# Patient Record
Sex: MALE | Race: WHITE | NOT HISPANIC OR LATINO
[De-identification: names, ages, dates, MRNs, and addresses within clinical notes are randomized per-mention and may not be internally consistent; named-entity substitution may affect disease eponyms.]

---

## 2017-07-06 ENCOUNTER — LABORATORY RESULT (OUTPATIENT)
Age: 57
End: 2017-07-06

## 2018-07-17 ENCOUNTER — APPOINTMENT (OUTPATIENT)
Dept: FAMILY MEDICINE | Facility: CLINIC | Age: 58
End: 2018-07-17
Payer: COMMERCIAL

## 2018-07-17 VITALS
TEMPERATURE: 98.6 F | RESPIRATION RATE: 21 BRPM | SYSTOLIC BLOOD PRESSURE: 124 MMHG | DIASTOLIC BLOOD PRESSURE: 76 MMHG | OXYGEN SATURATION: 97 % | HEART RATE: 83 BPM

## 2018-07-17 PROCEDURE — 99214 OFFICE O/P EST MOD 30 MIN: CPT | Mod: 25

## 2018-07-17 PROCEDURE — G0444 DEPRESSION SCREEN ANNUAL: CPT | Mod: 79

## 2018-08-02 ENCOUNTER — LABORATORY RESULT (OUTPATIENT)
Age: 58
End: 2018-08-02

## 2018-08-03 LAB
25(OH)D3 SERPL-MCNC: 52 NG/ML
ALBUMIN SERPL ELPH-MCNC: 4.8 G/DL
ALP BLD-CCNC: 56 U/L
ALT SERPL-CCNC: 19 U/L
ANION GAP SERPL CALC-SCNC: 12 MMOL/L
APPEARANCE: CLEAR
AST SERPL-CCNC: 20 U/L
B BURGDOR AB SER-IMP: NEGATIVE
B BURGDOR IGG+IGM SER QL IB: NORMAL
B BURGDOR IGG+IGM SER QL: 0.62 INDEX
BASOPHILS # BLD AUTO: 0.06 K/UL
BASOPHILS NFR BLD AUTO: 1 %
BILIRUB SERPL-MCNC: 0.5 MG/DL
BILIRUBIN URINE: NEGATIVE
BLOOD URINE: ABNORMAL
BUN SERPL-MCNC: 19 MG/DL
CALCIUM SERPL-MCNC: 9.3 MG/DL
CHLORIDE SERPL-SCNC: 103 MMOL/L
CHOLEST SERPL-MCNC: 205 MG/DL
CHOLEST/HDLC SERPL: 3.8 RATIO
CO2 SERPL-SCNC: 25 MMOL/L
COLOR: YELLOW
CREAT SERPL-MCNC: 1.14 MG/DL
CREAT SPEC-SCNC: 140 MG/DL
CRP SERPL-MCNC: <0.1 MG/DL
EOSINOPHIL # BLD AUTO: 0.18 K/UL
EOSINOPHIL NFR BLD AUTO: 3 %
ERYTHROCYTE [SEDIMENTATION RATE] IN BLOOD BY WESTERGREN METHOD: 4 MM/HR
FERRITIN SERPL-MCNC: 226 NG/ML
FOLATE SERPL-MCNC: 15.8 NG/ML
FT4I SERPL CALC-MCNC: 7 INDEX
GLUCOSE QUALITATIVE U: NEGATIVE MG/DL
GLUCOSE SERPL-MCNC: 105 MG/DL
HBA1C MFR BLD HPLC: 5.7 %
HCT VFR BLD CALC: 45.1 %
HCYS SERPL-MCNC: 8.2 UMOL/L
HDLC SERPL-MCNC: 54 MG/DL
HGB BLD-MCNC: 15.1 G/DL
IMM GRANULOCYTES NFR BLD AUTO: 0.3 %
INSULIN SERPL-MCNC: 5.7 UU/ML
KETONES URINE: NEGATIVE
LDLC SERPL CALC-MCNC: 139 MG/DL
LEUKOCYTE ESTERASE URINE: NEGATIVE
LYMPHOCYTES # BLD AUTO: 2.22 K/UL
LYMPHOCYTES NFR BLD AUTO: 37.2 %
MAN DIFF?: NORMAL
MCHC RBC-ENTMCNC: 29.3 PG
MCHC RBC-ENTMCNC: 33.5 GM/DL
MCV RBC AUTO: 87.4 FL
MICROALBUMIN 24H UR DL<=1MG/L-MCNC: <1.2 MG/DL
MICROALBUMIN/CREAT 24H UR-RTO: NORMAL
MONOCYTES # BLD AUTO: 0.62 K/UL
MONOCYTES NFR BLD AUTO: 10.4 %
NEUTROPHILS # BLD AUTO: 2.87 K/UL
NEUTROPHILS NFR BLD AUTO: 48.1 %
NITRITE URINE: NEGATIVE
PH URINE: 6.5
PLATELET # BLD AUTO: 274 K/UL
POTASSIUM SERPL-SCNC: 4.5 MMOL/L
PROT SERPL-MCNC: 6.9 G/DL
PROTEIN URINE: NEGATIVE MG/DL
PSA FREE FLD-MCNC: 37.4
PSA FREE SERPL-MCNC: 0.49 NG/ML
PSA SERPL-MCNC: 1.31 NG/ML
RBC # BLD: 5.16 M/UL
RBC # FLD: 13 %
RHEUMATOID FACT SER QL: <10 IU/ML
SODIUM SERPL-SCNC: 140 MMOL/L
SPECIFIC GRAVITY URINE: 1.02
T3FREE SERPL-MCNC: 2.54 PG/ML
T3RU NFR SERPL: 0.84 INDEX
T4 FREE SERPL-MCNC: 1.3 NG/DL
T4 SERPL-MCNC: 5.9 UG/DL
TRIGL SERPL-MCNC: 58 MG/DL
TSH SERPL-ACNC: 2.25 UIU/ML
TTG IGA SER IA-ACNC: <5 UNITS
TTG IGA SER-ACNC: NEGATIVE
TTG IGG SER IA-ACNC: <5 UNITS
TTG IGG SER IA-ACNC: NEGATIVE
UROBILINOGEN URINE: NEGATIVE MG/DL
VIT B12 SERPL-MCNC: 791 PG/ML
WBC # FLD AUTO: 5.97 K/UL

## 2018-08-06 LAB
ANA SER IF-ACNC: NEGATIVE
ANAPLASMA PHAGOCYTO IGM COMENT: NORMAL
ANAPLASMA PHAGOCYTO IGM COMMENT: NORMAL
ANAPLASMA PHAGOCYTOPHILIA IGG ANTIBODIES: NORMAL
ANAPLASMA PHAGOCYTOPHILIA IGM ANTIBODIES: NORMAL
CLAM IGE QN: <0.1 KUA/L
CODFISH IGE QN: <0.1 KUA/L
CORN IGE QN: <0.1 KUA/L
COW MILK IGE QN: <0.1 KUA/L
DEPRECATED CLAM IGE RAST QL: 0
DEPRECATED CODFISH IGE RAST QL: 0
DEPRECATED CORN IGE RAST QL: 0
DEPRECATED COW MILK IGE RAST QL: 0
DEPRECATED EGG WHITE IGE RAST QL: 0
DEPRECATED GLUTEN IGE RAST QL: <0.1 KUA/L
DEPRECATED PEANUT IGE RAST QL: 0
DEPRECATED SCALLOP IGE RAST QL: <0.1 KUA/L
DEPRECATED SESAME SEED IGE RAST QL: 0
DEPRECATED SHRIMP IGE RAST QL: 0
DEPRECATED SOYBEAN IGE RAST QL: 0
DEPRECATED WALNUT IGE RAST QL: 0
DEPRECATED WHEAT IGE RAST QL: NORMAL
EGG WHITE IGE QN: <0.1 KUA/L
EHRLICIA CHAFFEENIS IGG ANTIBODIES: NORMAL
EHRLICIA CHAFFEENIS IGG COMMENT: NORMAL
EHRLICIA CHAFFEENIS IGG INTERP: NORMAL
EHRLICIA CHAFFEENIS IGM ANTIBODIES: NORMAL
ESTROGEN SERPL-MCNC: 54 PG/ML
GLUTEN IGG QN: 0
PEANUT IGE QN: <0.1 KUA/L
SCALLOP IGE QN: 0
SCALLOP IGE QN: <0.1 KUA/L
SESAME SEED IGE QN: <0.1 KUA/L
SOYBEAN IGE QN: <0.1 KUA/L
THYROGLOB AB SERPL-ACNC: <20 IU/ML
THYROPEROXIDASE AB SERPL IA-ACNC: <10 IU/ML
TOTAL IGE SMQN RAST: 83 KU/L
WALNUT IGE QN: <0.1 KUA/L
WHEAT IGE QN: 0.1 KUA/L

## 2018-08-07 LAB
B MICROTI AB SER QL: NORMAL
BABESIA ANTIBODIES, IGG: NORMAL
BABESIA ANTIBODIES, IGM: NORMAL
ESTRONE SERPL-MCNC: 13 PG/ML
R RICKETTSI IGG CSF-ACNC: NEGATIVE
R RICKETTSI IGM CSF-ACNC: 0.31 INDEX
TESTOST BND SERPL-MCNC: 8.3 PG/ML
TESTOST SERPL-MCNC: 431.3 NG/DL

## 2019-02-05 ENCOUNTER — APPOINTMENT (OUTPATIENT)
Dept: FAMILY MEDICINE | Facility: CLINIC | Age: 59
End: 2019-02-05
Payer: COMMERCIAL

## 2019-02-05 DIAGNOSIS — N20.0 CALCULUS OF KIDNEY: ICD-10-CM

## 2019-02-05 DIAGNOSIS — Z09 ENCOUNTER FOR FOLLOW-UP EXAMINATION AFTER COMPLETED TREATMENT FOR CONDITIONS OTHER THAN MALIGNANT NEOPLASM: ICD-10-CM

## 2019-02-05 DIAGNOSIS — R31.9 HEMATURIA, UNSPECIFIED: ICD-10-CM

## 2019-02-05 DIAGNOSIS — K82.8 OTHER SPECIFIED DISEASES OF GALLBLADDER: ICD-10-CM

## 2019-02-05 PROCEDURE — 99215 OFFICE O/P EST HI 40 MIN: CPT

## 2019-02-07 LAB — BACTERIA UR CULT: NORMAL

## 2019-02-09 VITALS
RESPIRATION RATE: 18 BRPM | HEIGHT: 70 IN | TEMPERATURE: 98 F | HEART RATE: 80 BPM | OXYGEN SATURATION: 98 % | WEIGHT: 170 LBS | BODY MASS INDEX: 24.34 KG/M2 | SYSTOLIC BLOOD PRESSURE: 128 MMHG | DIASTOLIC BLOOD PRESSURE: 76 MMHG

## 2019-02-09 PROBLEM — Z09 HOSPITAL DISCHARGE FOLLOW-UP: Status: ACTIVE | Noted: 2019-02-09

## 2019-02-09 PROBLEM — K82.8 GALLBLADDER SLUDGE: Status: ACTIVE | Noted: 2019-02-09

## 2019-02-19 ENCOUNTER — LABORATORY RESULT (OUTPATIENT)
Age: 59
End: 2019-02-19

## 2019-02-27 LAB
25(OH)D3 SERPL-MCNC: 51 NG/ML
ALBUMIN SERPL ELPH-MCNC: 4.6 G/DL
ALP BLD-CCNC: 58 U/L
ALT SERPL-CCNC: 24 U/L
ANION GAP SERPL CALC-SCNC: 11 MMOL/L
APPEARANCE: CLEAR
AST SERPL-CCNC: 21 U/L
BASOPHILS # BLD AUTO: 0.07 K/UL
BASOPHILS NFR BLD AUTO: 1.2 %
BILIRUB SERPL-MCNC: 0.6 MG/DL
BILIRUBIN URINE: NEGATIVE
BLOOD URINE: NORMAL
BUN SERPL-MCNC: 22 MG/DL
CALCIUM SERPL-MCNC: 9.8 MG/DL
CHLORIDE SERPL-SCNC: 102 MMOL/L
CHOLEST SERPL-MCNC: 218 MG/DL
CHOLEST/HDLC SERPL: 3.7 RATIO
CK SERPL-CCNC: 81 U/L
CO2 SERPL-SCNC: 27 MMOL/L
COLOR: NORMAL
CREAT SERPL-MCNC: 1.19 MG/DL
CREAT SPEC-SCNC: 145 MG/DL
EOSINOPHIL # BLD AUTO: 0.2 K/UL
EOSINOPHIL NFR BLD AUTO: 3.3 %
FERRITIN SERPL-MCNC: 257 NG/ML
FOLATE SERPL-MCNC: >20 NG/ML
GGT SERPL-CCNC: 12 U/L
GLUCOSE QUALITATIVE U: NEGATIVE
GLUCOSE SERPL-MCNC: 105 MG/DL
HBA1C MFR BLD HPLC: 5.6 %
HCT VFR BLD CALC: 46.6 %
HDLC SERPL-MCNC: 59 MG/DL
HGB BLD-MCNC: 15.2 G/DL
IMM GRANULOCYTES NFR BLD AUTO: 0.5 %
KETONES URINE: NEGATIVE
LDLC SERPL CALC-MCNC: 147 MG/DL
LEUKOCYTE ESTERASE URINE: NEGATIVE
LYMPHOCYTES # BLD AUTO: 2.35 K/UL
LYMPHOCYTES NFR BLD AUTO: 39.2 %
MAN DIFF?: NORMAL
MCHC RBC-ENTMCNC: 28.8 PG
MCHC RBC-ENTMCNC: 32.6 GM/DL
MCV RBC AUTO: 88.4 FL
MICROALBUMIN 24H UR DL<=1MG/L-MCNC: <1.2 MG/DL
MICROALBUMIN/CREAT 24H UR-RTO: NORMAL MG/G
MONOCYTES # BLD AUTO: 0.58 K/UL
MONOCYTES NFR BLD AUTO: 9.7 %
NEUTROPHILS # BLD AUTO: 2.77 K/UL
NEUTROPHILS NFR BLD AUTO: 46.1 %
NITRITE URINE: NEGATIVE
PH URINE: 7
PLATELET # BLD AUTO: 282 K/UL
POTASSIUM SERPL-SCNC: 4.7 MMOL/L
PROT SERPL-MCNC: 7.1 G/DL
PROTEIN URINE: NEGATIVE
PSA FREE FLD-MCNC: 37 %
PSA FREE SERPL-MCNC: 0.53 NG/ML
PSA SERPL-MCNC: 1.45 NG/ML
RBC # BLD: 5.27 M/UL
RBC # FLD: 12.7 %
SODIUM SERPL-SCNC: 140 MMOL/L
SPECIFIC GRAVITY URINE: 1.02
T3RU NFR SERPL: 0.8 TBI
T4 FREE SERPL-MCNC: 1.3 NG/DL
TESTOST BND SERPL-MCNC: 9.6 PG/ML
TESTOST SERPL-MCNC: 559.8 NG/DL
TRIGL SERPL-MCNC: 60 MG/DL
TSH SERPL-ACNC: 2.8 UIU/ML
UROBILINOGEN URINE: NORMAL
VIT B12 SERPL-MCNC: 672 PG/ML
WBC # FLD AUTO: 6 K/UL

## 2019-04-02 PROBLEM — B02.9 SHINGLES: Status: ACTIVE | Noted: 2019-04-02

## 2019-04-03 ENCOUNTER — OUTPATIENT (OUTPATIENT)
Dept: OUTPATIENT SERVICES | Facility: HOSPITAL | Age: 59
LOS: 1 days | Discharge: HOME | End: 2019-04-03

## 2019-04-03 DIAGNOSIS — C82.00 FOLLICULAR LYMPHOMA GRADE I, UNSPECIFIED SITE: ICD-10-CM

## 2019-04-04 ENCOUNTER — LABORATORY RESULT (OUTPATIENT)
Age: 59
End: 2019-04-04

## 2019-04-04 ENCOUNTER — APPOINTMENT (OUTPATIENT)
Dept: HEMATOLOGY ONCOLOGY | Facility: CLINIC | Age: 59
End: 2019-04-04
Payer: COMMERCIAL

## 2019-04-04 ENCOUNTER — RESULT REVIEW (OUTPATIENT)
Age: 59
End: 2019-04-04

## 2019-04-04 VITALS
DIASTOLIC BLOOD PRESSURE: 85 MMHG | OXYGEN SATURATION: 98 % | WEIGHT: 166.31 LBS | HEIGHT: 70 IN | SYSTOLIC BLOOD PRESSURE: 138 MMHG | HEART RATE: 71 BPM | BODY MASS INDEX: 23.81 KG/M2 | TEMPERATURE: 98.1 F

## 2019-04-04 LAB
BASOPHILS # BLD AUTO: 0.1 K/UL — SIGNIFICANT CHANGE UP (ref 0–0.2)
BASOPHILS NFR BLD AUTO: 1.7 % — SIGNIFICANT CHANGE UP (ref 0–2)
EOSINOPHIL # BLD AUTO: 0.2 K/UL — SIGNIFICANT CHANGE UP (ref 0–0.5)
EOSINOPHIL NFR BLD AUTO: 3.1 % — SIGNIFICANT CHANGE UP (ref 0–6)
HCT VFR BLD CALC: 48.9 % — SIGNIFICANT CHANGE UP (ref 39–50)
HGB BLD-MCNC: 15.9 G/DL — SIGNIFICANT CHANGE UP (ref 13–17)
LYMPHOCYTES # BLD AUTO: 2.4 K/UL — SIGNIFICANT CHANGE UP (ref 1–3.3)
LYMPHOCYTES # BLD AUTO: 30.6 % — SIGNIFICANT CHANGE UP (ref 13–44)
MCHC RBC-ENTMCNC: 28.8 PG — SIGNIFICANT CHANGE UP (ref 27–34)
MCHC RBC-ENTMCNC: 32.5 G/DL — SIGNIFICANT CHANGE UP (ref 32–36)
MCV RBC AUTO: 88.4 FL — SIGNIFICANT CHANGE UP (ref 80–100)
MONOCYTES # BLD AUTO: 0.8 K/UL — SIGNIFICANT CHANGE UP (ref 0–0.9)
MONOCYTES NFR BLD AUTO: 9.8 % — SIGNIFICANT CHANGE UP (ref 2–14)
NEUTROPHILS # BLD AUTO: 4.3 K/UL — SIGNIFICANT CHANGE UP (ref 1.8–7.4)
NEUTROPHILS NFR BLD AUTO: 54.8 % — SIGNIFICANT CHANGE UP (ref 43–77)
PLATELET # BLD AUTO: 300 K/UL — SIGNIFICANT CHANGE UP (ref 150–400)
RBC # BLD: 5.53 M/UL — SIGNIFICANT CHANGE UP (ref 4.2–5.8)
RBC # FLD: 11.7 % — SIGNIFICANT CHANGE UP (ref 10.3–14.5)
WBC # BLD: 7.9 K/UL — SIGNIFICANT CHANGE UP (ref 3.8–10.5)
WBC # FLD AUTO: 7.9 K/UL — SIGNIFICANT CHANGE UP (ref 3.8–10.5)

## 2019-04-04 PROCEDURE — 99205 OFFICE O/P NEW HI 60 MIN: CPT

## 2019-04-04 RX ORDER — VALACYCLOVIR 1 G/1
1 TABLET, FILM COATED ORAL
Qty: 21 | Refills: 5 | Status: DISCONTINUED | COMMUNITY
Start: 2019-04-02 | End: 2019-04-04

## 2019-04-04 RX ORDER — NITROFURANTOIN MACROCRYSTALS 100 MG/1
100 CAPSULE ORAL TWICE DAILY
Qty: 20 | Refills: 0 | Status: DISCONTINUED | COMMUNITY
Start: 2019-02-05 | End: 2019-04-04

## 2019-04-05 NOTE — ASSESSMENT
[FreeTextEntry1] : 58 year gentleman with new onset retroperitoneal lymphadenopathy that has developed over a course of 7-8 weeks.  He has no prior history of malignancy.  2/1/19 CT abd/pelvis was negative for any concerning findings. MRI/MRCP 3/30/19 showed enlarged retroperitoneal nodes, with one measuring 4.5 cm.  Scrotal US was negative.\par \par He has ongoing dysuria, but no fevers or B symptoms. He has a previous history of prostatitis.  Differential diagnosis at this time is either lymphoma/lymphoproliferative disorder vs. ?related to prostatitis.\par \par \par Plan:\par - PET/CT scan ASAP\par - Will discuss with IR if the nodes are accessible for  biopsy\par - Urinalysis and urine culture today. \par -labs including LDH, immunoelectrophoresis today\par - Tentative follow up in 2 weeks

## 2019-04-05 NOTE — PHYSICAL EXAM
[Normal] : affect appropriate [de-identified] : supple [de-identified] : clear bilaterally [de-identified] : S1/S2 [de-identified] : No edema [de-identified] : soft, non tender, non distended [de-identified] : No back pain on palpation

## 2019-04-05 NOTE — CONSULT LETTER
[Dear  ___] : Dear  [unfilled], [Consult Letter:] : I had the pleasure of evaluating your patient, [unfilled]. [Please see my note below.] : Please see my note below. [Consult Closing:] : Thank you very much for allowing me to participate in the care of this patient.  If you have any questions, please do not hesitate to contact me. [Sincerely,] : Sincerely, [FreeTextEntry3] : Concepción Sanchez MD\par Medical Oncology/Hematology\par [facility] [place]\par \par \par [facility], [place], [facility] by [facility]\par

## 2019-04-05 NOTE — HISTORY OF PRESENT ILLNESS
[de-identified] : Mr. Puri presents with retroperitoneal lymphadenopathy.\par \par The patient was admitted to [facility] 2/1/19 - 2/2/19 with sharp right sided pain that radiated from his right flank to anterior abdomen. He denied any trauma or heavy lifting. His thoracic spine MRI was unremarkable and CT A/P on 2/1/19 was negative for pyelonephritis and nephroureterolithiasis, but showed gallbladder sludge or tiny stones. Patient had urinary retention in the ED, possibly in the setting of Dilaudid, and De Guzman was placed for a short period of time. \par \par Three weeks ago his back pain worsened and he was unable to sleep. His back pain worsens when he is laying down. He has lost about 7 pounds in the past month. Also reports dysuria, new since his hospital stay and De Guzman removal. He notes that it feels similar to symptoms he experienced with prostatitis in the past. He notes recently developed bad breath. No diarrhea. No CP. Chronic dry cough, been ongoing for years. No SOB. No N/V. He reports that last week he had a rash on his back with associated itching and burning. He reports being more stressed the past 2 months. No fevers or night sweats. No recurrent infections. \par \par Last colonoscopy was in 2016. Cystoscopy few years ago due to persistent hematuria which was normal.\par \par 2/2/19 RUQ sono- cholelithiasis w/o evidence of acute cholecystitis\par 2/1/19 kidney sono - no evidence of hydronephrosis. No evidence of renal calculi bilaterally. Simple cyst in L kidney\par 2/1/19 CT chest - No PE. Clear lungs. \par 2/6/19 urine cytology - negative for malignant cells\par 3/30/19 MRI / MRCP - enlarged hypoenhancing retroperitoneal  aortocaval nodes, 1.6 x 4.5 cm (previously 1.6 cm), and 1.2 x 1.9 cm (previously 0.8 x 1 cm). Additional smaller retroperitoneal nodes also present. \par 4/1/19 scrotal US - small R epididymal head cyst. Small left hydrocele. No evidence for testicular mass. \par PMHx: measles in 2007, No previous history of cancer, lumbar spinal stenosis, congenital disc herniation, prostatitis\par FMHx: cousin with lymphoma in early 40s, cousin with testicular cancer in his 50s, aunt lymphoma in 70s, uncle lung cancer but wife was a 2 pack smoker. \par SHx: , chiropractor

## 2019-04-09 ENCOUNTER — APPOINTMENT (OUTPATIENT)
Dept: HEMATOLOGY ONCOLOGY | Facility: CLINIC | Age: 59
End: 2019-04-09
Payer: COMMERCIAL

## 2019-04-09 DIAGNOSIS — B02.9 ZOSTER W/OUT COMPLICATIONS: ICD-10-CM

## 2019-04-09 LAB
ALBUMIN MFR SERPL ELPH: 66.1 %
ALBUMIN SERPL ELPH-MCNC: 5.1 G/DL
ALBUMIN SERPL-MCNC: 5 G/DL
ALBUMIN/GLOB SERPL: 1.9 RATIO
ALP BLD-CCNC: 65 U/L
ALPHA1 GLOB MFR SERPL ELPH: 3.2 %
ALPHA1 GLOB SERPL ELPH-MCNC: 0.2 G/DL
ALPHA2 GLOB MFR SERPL ELPH: 7 %
ALPHA2 GLOB SERPL ELPH-MCNC: 0.5 G/DL
ALT SERPL-CCNC: 27 U/L
ANION GAP SERPL CALC-SCNC: 13 MMOL/L
APPEARANCE: CLEAR
APTT BLD: 30.9 SEC
AST SERPL-CCNC: 23 U/L
B-GLOBULIN MFR SERPL ELPH: 9.6 %
B-GLOBULIN SERPL ELPH-MCNC: 0.7 G/DL
B2 MICROGLOB SERPL-MCNC: 1.7 MG/L
BILIRUB SERPL-MCNC: 0.2 MG/DL
BILIRUBIN URINE: NEGATIVE
BLOOD URINE: NEGATIVE
BUN SERPL-MCNC: 27 MG/DL
CALCIUM SERPL-MCNC: 10.2 MG/DL
CHLORIDE SERPL-SCNC: 100 MMOL/L
CO2 SERPL-SCNC: 27 MMOL/L
COLOR: YELLOW
CREAT SERPL-MCNC: 1.24 MG/DL
DEPRECATED KAPPA LC FREE/LAMBDA SER: 1.35 RATIO
GAMMA GLOB FLD ELPH-MCNC: 1.1 G/DL
GAMMA GLOB MFR SERPL ELPH: 14.1 %
GLUCOSE QUALITATIVE U: NEGATIVE
IGA SER QL IEP: 91 MG/DL
IGG SER QL IEP: 944 MG/DL
IGM SER QL IEP: 155 MG/DL
INR PPP: 0.96 RATIO
INTERPRETATION SERPL IEP-IMP: NORMAL
KAPPA LC CSF-MCNC: 1.16 MG/DL
KAPPA LC SERPL-MCNC: 1.57 MG/DL
KETONES URINE: NEGATIVE
LDH SERPL-CCNC: 174 U/L
LEUKOCYTE ESTERASE URINE: NEGATIVE
M PROTEIN SPEC IFE-MCNC: NORMAL
NITRITE URINE: NEGATIVE
PH URINE: 5.5
POTASSIUM SERPL-SCNC: 4.6 MMOL/L
PROT SERPL-MCNC: 7.6 G/DL
PROTEIN URINE: NEGATIVE
PT BLD: 10.9 SEC
SODIUM SERPL-SCNC: 140 MMOL/L
SPECIFIC GRAVITY URINE: 1.02
UROBILINOGEN URINE: NORMAL

## 2019-04-10 ENCOUNTER — FORM ENCOUNTER (OUTPATIENT)
Age: 59
End: 2019-04-10

## 2019-04-11 ENCOUNTER — APPOINTMENT (OUTPATIENT)
Dept: NUCLEAR MEDICINE | Facility: CLINIC | Age: 59
End: 2019-04-11
Payer: COMMERCIAL

## 2019-04-11 ENCOUNTER — OUTPATIENT (OUTPATIENT)
Dept: OUTPATIENT SERVICES | Facility: HOSPITAL | Age: 59
LOS: 1 days | End: 2019-04-11

## 2019-04-11 DIAGNOSIS — R59.0 LOCALIZED ENLARGED LYMPH NODES: ICD-10-CM

## 2019-04-11 DIAGNOSIS — M54.9 DORSALGIA, UNSPECIFIED: ICD-10-CM

## 2019-04-11 PROCEDURE — 78815 PET IMAGE W/CT SKULL-THIGH: CPT | Mod: 26,PI

## 2019-04-15 ENCOUNTER — RESULT REVIEW (OUTPATIENT)
Age: 59
End: 2019-04-15

## 2019-04-16 ENCOUNTER — APPOINTMENT (OUTPATIENT)
Dept: SURGICAL ONCOLOGY | Facility: CLINIC | Age: 59
End: 2019-04-16
Payer: COMMERCIAL

## 2019-04-16 ENCOUNTER — TRANSCRIPTION ENCOUNTER (OUTPATIENT)
Age: 59
End: 2019-04-16

## 2019-04-16 VITALS
BODY MASS INDEX: 23.05 KG/M2 | DIASTOLIC BLOOD PRESSURE: 87 MMHG | TEMPERATURE: 98.4 F | RESPIRATION RATE: 16 BRPM | WEIGHT: 161 LBS | HEIGHT: 70 IN | HEART RATE: 63 BPM | SYSTOLIC BLOOD PRESSURE: 147 MMHG | OXYGEN SATURATION: 98 %

## 2019-04-16 DIAGNOSIS — Z80.3 FAMILY HISTORY OF MALIGNANT NEOPLASM OF BREAST: ICD-10-CM

## 2019-04-16 DIAGNOSIS — Z80.1 FAMILY HISTORY OF MALIGNANT NEOPLASM OF TRACHEA, BRONCHUS AND LUNG: ICD-10-CM

## 2019-04-16 DIAGNOSIS — Z80.7 FAMILY HISTORY OF OTHER MALIGNANT NEOPLASMS OF LYMPHOID, HEMATOPOIETIC AND RELATED TISSUES: ICD-10-CM

## 2019-04-16 PROCEDURE — 99204 OFFICE O/P NEW MOD 45 MIN: CPT

## 2019-04-16 NOTE — REASON FOR VISIT
[Initial Consultation] : an initial consultation for [Spouse] : spouse [FreeTextEntry2] : retroperitoneal lymphadenopathy

## 2019-04-16 NOTE — REVIEW OF SYSTEMS
[Negative] : Heme/Lymph [FreeTextEntry2] : weight loss [de-identified] : right lower back/right groin pain [FreeTextEntry8] : loose stools [de-identified] : anxious

## 2019-04-16 NOTE — ASSESSMENT
[FreeTextEntry1] : 58 year gentleman with new onset retroperitoneal lymphadenopathy that has developed over a course of 7-8 weeks. He has no prior history of malignancy. 2/1/19 CT abd/pelvis was negative for any concerning findings. MRI/MRCP 3/30/19 showed enlarged retroperitoneal nodes, with one measuring 4.5 cm. Scrotal US was negative.\par \par PET CT 4/11/19:\par 1) FDG avid retroperitoneal lymph nodes corresponding to findings seen on MRI, suspicious for malignancy, either lymphoma or metastatic disease. \par \par We discussed the risks, benefits, and alternatives of the procedure with the patient, he expressed understanding and agree to proceed with robotic assisted laparoscopic resection of retroperitoneal mass.  Risks include but are not limited to bleeding, infection, injury to surrounding organs.\par \par PLAN:\par 1) OR for robotic assisted laparoscopic resection of retroperitoneal mass, possible open\par \par

## 2019-04-16 NOTE — HISTORY OF PRESENT ILLNESS
[de-identified] : 58 year male presents for an initial consultation for retroperitoneal lymphadenopathy, referred by Dr. Sanchez. \par \par ---------------------------------------------------------------------------------------------------------------------------------\par HPI as per Dr. Sanchez:\par The patient was admitted to [facility] 2/1/19 - 2/2/19 with sharp right sided pain that radiated from his right flank to anterior abdomen. He denied any trauma or heavy lifting. His thoracic spine MRI was unremarkable and CT A/P on 2/1/19 was negative for pyelonephritis and nephroureterolithiasis, but showed gallbladder sludge or tiny stones. Patient had urinary retention in the ED, possibly in the setting of Dilaudid, and De Guzman was placed for a short period of time. \par \par Three weeks ago his back pain worsened and he was unable to sleep. His back pain worsens when he is laying down. He has lost about 7 pounds in the past month. Also reports dysuria, new since his hospital stay and De Guzman removal. He notes that it feels similar to symptoms he experienced with prostatitis in the past. He notes recently developed bad breath. No diarrhea. No CP. Chronic dry cough, been ongoing for years. No SOB. No N/V. He reports that last week he had a rash on his back with associated itching and burning. He reports being more stressed the past 2 months. No fevers or night sweats. No recurrent infections. \par \par Last colonoscopy was in 2016. Cystoscopy few years ago due to persistent hematuria which was normal.\par \par 2/2/19 RUQ sono- cholelithiasis w/o evidence of acute cholecystitis\par 2/1/19 kidney sono - no evidence of hydronephrosis. No evidence of renal calculi bilaterally. Simple cyst in L kidney\par 2/1/19 CT chest - No PE. Clear lungs. \par 2/6/19 urine cytology - negative for malignant cells\par 3/30/19 MRI / MRCP - enlarged hypo enhancing retroperitoneal aortocaval nodes, 1.6 x 4.5 cm (previously 1.6 cm), and 1.2 x 1.9 cm (previously 0.8 x 1 cm). Additional smaller retroperitoneal nodes also present.  Differential considerations of lymphoma/other lymphoproliferative process versus metastatic disease. \par 4/1/19 scrotal US - small R epididymal head cyst. Small left hydrocele. No evidence for testicular mass. \par PMHx: measles in 2007, No previous history of cancer, lumbar spinal stenosis, congenital disc herniation, prostatitis, Lyme disease in 1998, juliette mountain spotted fever in 2015, chicken pox in 1993, rhinoplasty in 1979. No medications, NKA. \par FMHx: cousin with lymphoma in early 40s, cousin with testicular cancer in his 50s, aunt lymphoma in 70s, uncle lung cancer but wife was a 2 pack smoker. \par SHx: , chiropractor.\par \par ------------------------------------------------------------------------------------------------------------------------------\par \par PET CT 4/11/19:\par 1) FDG avid retroperitoneal lymph nodes corresponding to findings seen on MRI, suspicious for malignancy, either lymphoma or metastatic disease. \par 2) Subcentimeter, minimally FDG avid right axillary lymph node is nonspecific, probably reactive.\par 3) 1.8 cm intramedullary stippled calcification right proximal humerus, probably benign enchondroma (consider MRI)\par \par CBC, CMP, LDH WNL, Negative UA\par \par Today he is with c/o 8-10 lb intentional weight loss over the past month due to calorie restriction, occasional night sweats and chills, no fevers, right lower back/right groin pain which is worse at night and when lying on that side. Denies abdominal pain, nausea or vomiting. States he has been experiencing loose stools due to anxiety.

## 2019-04-16 NOTE — CONSULT LETTER
[Dear  ___] : Dear  [unfilled], [Consult Letter:] : I had the pleasure of evaluating your patient, [unfilled]. [Please see my note below.] : Please see my note below. [Consult Closing:] : Thank you very much for allowing me to participate in the care of this patient.  If you have any questions, please do not hesitate to contact me. [Sincerely,] : Sincerely, [DrStorm  ___] : Dr. RAINES [FreeTextEntry3] : Clay Myers MD, MPH, FACS\par Surgical Oncology\par Assistant Professor of Surgery\par [facility] at [facility]

## 2019-04-16 NOTE — PHYSICAL EXAM
[Normal] : supple, no neck mass and thyroid not enlarged [Normal Groin Lymph Nodes] : normal groin lymph nodes [Normal] : oriented to person, place and time, with appropriate affect

## 2019-04-18 ENCOUNTER — APPOINTMENT (OUTPATIENT)
Dept: HEMATOLOGY ONCOLOGY | Facility: CLINIC | Age: 59
End: 2019-04-18

## 2019-04-22 ENCOUNTER — APPOINTMENT (OUTPATIENT)
Dept: SURGICAL ONCOLOGY | Facility: HOSPITAL | Age: 59
End: 2019-04-22

## 2019-04-25 ENCOUNTER — RX CHANGE (OUTPATIENT)
Age: 59
End: 2019-04-25

## 2019-05-17 ENCOUNTER — OUTPATIENT (OUTPATIENT)
Dept: OUTPATIENT SERVICES | Facility: HOSPITAL | Age: 59
LOS: 1 days | Discharge: HOME | End: 2019-05-17
Payer: COMMERCIAL

## 2019-05-17 VITALS
WEIGHT: 156.09 LBS | DIASTOLIC BLOOD PRESSURE: 80 MMHG | SYSTOLIC BLOOD PRESSURE: 130 MMHG | OXYGEN SATURATION: 98 % | HEART RATE: 65 BPM | RESPIRATION RATE: 16 BRPM | HEIGHT: 70 IN | TEMPERATURE: 98 F

## 2019-05-17 DIAGNOSIS — Z98.890 OTHER SPECIFIED POSTPROCEDURAL STATES: Chronic | ICD-10-CM

## 2019-05-17 DIAGNOSIS — E11.9 TYPE 2 DIABETES MELLITUS WITHOUT COMPLICATIONS: ICD-10-CM

## 2019-05-17 DIAGNOSIS — R19.09 OTHER INTRA-ABDOMINAL AND PELVIC SWELLING, MASS AND LUMP: ICD-10-CM

## 2019-05-17 DIAGNOSIS — Z01.818 ENCOUNTER FOR OTHER PREPROCEDURAL EXAMINATION: ICD-10-CM

## 2019-05-17 DIAGNOSIS — K58.9 IRRITABLE BOWEL SYNDROME WITHOUT DIARRHEA: ICD-10-CM

## 2019-05-17 DIAGNOSIS — M54.9 DORSALGIA, UNSPECIFIED: ICD-10-CM

## 2019-05-17 DIAGNOSIS — M51.27 OTHER INTERVERTEBRAL DISC DISPLACEMENT, LUMBOSACRAL REGION: ICD-10-CM

## 2019-05-17 DIAGNOSIS — R73.03 PREDIABETES: ICD-10-CM

## 2019-05-17 DIAGNOSIS — C85.16 UNSPECIFIED B-CELL LYMPHOMA, INTRAPELVIC LYMPH NODES: ICD-10-CM

## 2019-05-17 DIAGNOSIS — R33.9 RETENTION OF URINE, UNSPECIFIED: ICD-10-CM

## 2019-05-17 DIAGNOSIS — Z90.89 ACQUIRED ABSENCE OF OTHER ORGANS: Chronic | ICD-10-CM

## 2019-05-17 LAB
ABO RH CONFIRMATION: SIGNIFICANT CHANGE UP
ANION GAP SERPL CALC-SCNC: 6 MMOL/L — SIGNIFICANT CHANGE UP (ref 5–17)
APTT BLD: 30.2 SEC — SIGNIFICANT CHANGE UP (ref 27.5–36.3)
BASOPHILS # BLD AUTO: 0.07 K/UL — SIGNIFICANT CHANGE UP (ref 0–0.2)
BASOPHILS NFR BLD AUTO: 0.9 % — SIGNIFICANT CHANGE UP (ref 0–2)
BLD GP AB SCN SERPL QL: SIGNIFICANT CHANGE UP
BUN SERPL-MCNC: 29 MG/DL — HIGH (ref 7–23)
CALCIUM SERPL-MCNC: 9.3 MG/DL — SIGNIFICANT CHANGE UP (ref 8.5–10.1)
CHLORIDE SERPL-SCNC: 106 MMOL/L — SIGNIFICANT CHANGE UP (ref 96–108)
CO2 SERPL-SCNC: 28 MMOL/L — SIGNIFICANT CHANGE UP (ref 22–31)
CREAT SERPL-MCNC: 1.19 MG/DL — SIGNIFICANT CHANGE UP (ref 0.5–1.3)
EOSINOPHIL # BLD AUTO: 0.19 K/UL — SIGNIFICANT CHANGE UP (ref 0–0.5)
EOSINOPHIL NFR BLD AUTO: 2.6 % — SIGNIFICANT CHANGE UP (ref 0–6)
GLUCOSE SERPL-MCNC: 96 MG/DL — SIGNIFICANT CHANGE UP (ref 70–99)
HCT VFR BLD CALC: 42.7 % — SIGNIFICANT CHANGE UP (ref 39–50)
HGB BLD-MCNC: 14.6 G/DL — SIGNIFICANT CHANGE UP (ref 13–17)
IMM GRANULOCYTES NFR BLD AUTO: 0.4 % — SIGNIFICANT CHANGE UP (ref 0–1.5)
INR BLD: 0.99 RATIO — SIGNIFICANT CHANGE UP (ref 0.88–1.16)
LYMPHOCYTES # BLD AUTO: 2.42 K/UL — SIGNIFICANT CHANGE UP (ref 1–3.3)
LYMPHOCYTES # BLD AUTO: 32.7 % — SIGNIFICANT CHANGE UP (ref 13–44)
MCHC RBC-ENTMCNC: 29.4 PG — SIGNIFICANT CHANGE UP (ref 27–34)
MCHC RBC-ENTMCNC: 34.2 GM/DL — SIGNIFICANT CHANGE UP (ref 32–36)
MCV RBC AUTO: 85.9 FL — SIGNIFICANT CHANGE UP (ref 80–100)
MONOCYTES # BLD AUTO: 0.71 K/UL — SIGNIFICANT CHANGE UP (ref 0–0.9)
MONOCYTES NFR BLD AUTO: 9.6 % — SIGNIFICANT CHANGE UP (ref 2–14)
NEUTROPHILS # BLD AUTO: 3.97 K/UL — SIGNIFICANT CHANGE UP (ref 1.8–7.4)
NEUTROPHILS NFR BLD AUTO: 53.8 % — SIGNIFICANT CHANGE UP (ref 43–77)
PLATELET # BLD AUTO: 263 K/UL — SIGNIFICANT CHANGE UP (ref 150–400)
POTASSIUM SERPL-MCNC: 4.1 MMOL/L — SIGNIFICANT CHANGE UP (ref 3.5–5.3)
POTASSIUM SERPL-SCNC: 4.1 MMOL/L — SIGNIFICANT CHANGE UP (ref 3.5–5.3)
PROTHROM AB SERPL-ACNC: 11 SEC — SIGNIFICANT CHANGE UP (ref 10–12.9)
RBC # BLD: 4.97 M/UL — SIGNIFICANT CHANGE UP (ref 4.2–5.8)
RBC # FLD: 12.7 % — SIGNIFICANT CHANGE UP (ref 10.3–14.5)
SODIUM SERPL-SCNC: 140 MMOL/L — SIGNIFICANT CHANGE UP (ref 135–145)
TYPE + AB SCN PNL BLD: SIGNIFICANT CHANGE UP
WBC # BLD: 7.39 K/UL — SIGNIFICANT CHANGE UP (ref 3.8–10.5)
WBC # FLD AUTO: 7.39 K/UL — SIGNIFICANT CHANGE UP (ref 3.8–10.5)

## 2019-05-17 PROCEDURE — 93010 ELECTROCARDIOGRAM REPORT: CPT

## 2019-05-17 NOTE — H&P PST ADULT - NSANTHOSAYNRD_GEN_A_CORE
No. JERONIMO screening performed.  STOP BANG Legend: 0-2 = LOW Risk; 3-4 = INTERMEDIATE Risk; 5-8 = HIGH Risk

## 2019-05-17 NOTE — H&P PST ADULT - ATTENDING COMMENTS
Risks, benefits, and alternatives discussed with the patient - he expressed understanding and agrees to proceed with robotic-assisted laparoscopic biopsy of portocaval lymph node, possible open.

## 2019-05-17 NOTE — H&P PST ADULT - HISTORY OF PRESENT ILLNESS
58 year old male with retroperitoneal mass / retroperitoneal lymphadenopathy Pt c/o abdominal pain and approximately 20 lb weight loss since Feb 2019 ; he presents to PST for robotic assisted laparoscopic resection of retroperitoneal mass possible open

## 2019-05-17 NOTE — H&P PST ADULT - NSICDXFAMILYHX_GEN_ALL_CORE_FT
FAMILY HISTORY:  FH: COPD (chronic obstructive pulmonary disease), mother  FH: heart attack, father  FH: hypertension, father  FHx: diabetes mellitus, father

## 2019-05-17 NOTE — H&P PST ADULT - NSICDXPASTSURGICALHX_GEN_ALL_CORE_FT
PAST SURGICAL HISTORY:  H/O blepharoplasty 2015    H/O rhinoplasty 1979    S/P cystoscopy 2018    S/P tonsillectomy

## 2019-05-17 NOTE — H&P PST ADULT - ASSESSMENT
58 year old male presents to PST for robotic assisted laparoscopic resection of retroperitoneal mass possible open     Plan:  1. PST instructions given ; NPO post midnight   2.  Labs EKG  as per surgeon request   3. EZ wash instructions given   4. Medical Evaluation with Dr Adair   5. Stop NSAIDS ( Aspirin Alev Motrin Mobic Diclofenac), herbal supplements , MVI , Vitamin fish oil 7 days prior to surgery  unless directed by surgeon or cardiologist;

## 2019-05-21 ENCOUNTER — APPOINTMENT (OUTPATIENT)
Dept: FAMILY MEDICINE | Facility: CLINIC | Age: 59
End: 2019-05-21
Payer: COMMERCIAL

## 2019-05-21 ENCOUNTER — NON-APPOINTMENT (OUTPATIENT)
Age: 59
End: 2019-05-21

## 2019-05-21 VITALS
HEIGHT: 70 IN | BODY MASS INDEX: 22.9 KG/M2 | SYSTOLIC BLOOD PRESSURE: 142 MMHG | HEART RATE: 67 BPM | OXYGEN SATURATION: 97 % | WEIGHT: 160 LBS | DIASTOLIC BLOOD PRESSURE: 83 MMHG

## 2019-05-21 DIAGNOSIS — Z01.818 ENCOUNTER FOR OTHER PREPROCEDURAL EXAMINATION: ICD-10-CM

## 2019-05-21 PROBLEM — K58.9 IRRITABLE BOWEL SYNDROME, UNSPECIFIED: Chronic | Status: ACTIVE | Noted: 2019-05-17

## 2019-05-21 PROBLEM — M48.00 SPINAL STENOSIS, SITE UNSPECIFIED: Chronic | Status: ACTIVE | Noted: 2019-05-17

## 2019-05-21 PROBLEM — Z86.19 PERSONAL HISTORY OF OTHER INFECTIOUS AND PARASITIC DISEASES: Chronic | Status: ACTIVE | Noted: 2019-05-17

## 2019-05-21 PROBLEM — R31.29 OTHER MICROSCOPIC HEMATURIA: Chronic | Status: ACTIVE | Noted: 2019-05-17

## 2019-05-21 PROBLEM — R73.03 PREDIABETES: Chronic | Status: ACTIVE | Noted: 2019-05-17

## 2019-05-21 PROBLEM — M51.26 OTHER INTERVERTEBRAL DISC DISPLACEMENT, LUMBAR REGION: Chronic | Status: ACTIVE | Noted: 2019-05-17

## 2019-05-21 PROBLEM — M54.9 DORSALGIA, UNSPECIFIED: Chronic | Status: ACTIVE | Noted: 2019-05-17

## 2019-05-21 PROBLEM — N20.0 CALCULUS OF KIDNEY: Chronic | Status: ACTIVE | Noted: 2019-05-17

## 2019-05-21 PROBLEM — K58.9 IRRITABLE BOWEL SYNDROME WITHOUT DIARRHEA: Chronic | Status: ACTIVE | Noted: 2019-05-17

## 2019-05-21 PROBLEM — A69.20 LYME DISEASE, UNSPECIFIED: Chronic | Status: ACTIVE | Noted: 2019-05-17

## 2019-05-21 PROBLEM — J45.909 UNSPECIFIED ASTHMA, UNCOMPLICATED: Chronic | Status: ACTIVE | Noted: 2019-05-17

## 2019-05-21 PROBLEM — R59.1 GENERALIZED ENLARGED LYMPH NODES: Chronic | Status: ACTIVE | Noted: 2019-05-17

## 2019-05-21 PROCEDURE — 99215 OFFICE O/P EST HI 40 MIN: CPT

## 2019-05-23 RX ORDER — FENTANYL CITRATE 50 UG/ML
50 INJECTION INTRAVENOUS
Refills: 0 | Status: DISCONTINUED | OUTPATIENT
Start: 2019-05-24 | End: 2019-05-24

## 2019-05-23 RX ORDER — SODIUM CHLORIDE 9 MG/ML
1000 INJECTION INTRAMUSCULAR; INTRAVENOUS; SUBCUTANEOUS
Refills: 0 | Status: DISCONTINUED | OUTPATIENT
Start: 2019-05-24 | End: 2019-05-24

## 2019-05-24 ENCOUNTER — RESULT REVIEW (OUTPATIENT)
Age: 59
End: 2019-05-24

## 2019-05-24 ENCOUNTER — OUTPATIENT (OUTPATIENT)
Dept: OUTPATIENT SERVICES | Facility: HOSPITAL | Age: 59
LOS: 1 days | Discharge: HOME | End: 2019-05-24
Payer: COMMERCIAL

## 2019-05-24 ENCOUNTER — APPOINTMENT (OUTPATIENT)
Dept: SURGICAL ONCOLOGY | Facility: HOSPITAL | Age: 59
End: 2019-05-24

## 2019-05-24 VITALS
RESPIRATION RATE: 16 BRPM | TEMPERATURE: 98 F | DIASTOLIC BLOOD PRESSURE: 91 MMHG | HEIGHT: 70 IN | HEART RATE: 66 BPM | OXYGEN SATURATION: 99 % | SYSTOLIC BLOOD PRESSURE: 136 MMHG | WEIGHT: 160.06 LBS

## 2019-05-24 VITALS
OXYGEN SATURATION: 99 % | DIASTOLIC BLOOD PRESSURE: 80 MMHG | HEART RATE: 73 BPM | TEMPERATURE: 99 F | SYSTOLIC BLOOD PRESSURE: 139 MMHG | RESPIRATION RATE: 18 BRPM

## 2019-05-24 DIAGNOSIS — Z98.890 OTHER SPECIFIED POSTPROCEDURAL STATES: Chronic | ICD-10-CM

## 2019-05-24 DIAGNOSIS — Z90.89 ACQUIRED ABSENCE OF OTHER ORGANS: Chronic | ICD-10-CM

## 2019-05-24 PROCEDURE — 49203: CPT | Mod: AS

## 2019-05-24 PROCEDURE — S2900 ROBOTIC SURGICAL SYSTEM: CPT | Mod: NC

## 2019-05-24 PROCEDURE — 88360 TUMOR IMMUNOHISTOCHEM/MANUAL: CPT | Mod: 26

## 2019-05-24 PROCEDURE — 88307 TISSUE EXAM BY PATHOLOGIST: CPT | Mod: 26

## 2019-05-24 PROCEDURE — 49203: CPT

## 2019-05-24 PROCEDURE — 88333 PATH CONSLTJ SURG CYTO XM 1: CPT | Mod: 26

## 2019-05-24 RX ORDER — OXYCODONE AND ACETAMINOPHEN 5; 325 MG/1; MG/1
2 TABLET ORAL EVERY 4 HOURS
Refills: 0 | Status: DISCONTINUED | OUTPATIENT
Start: 2019-05-24 | End: 2019-05-24

## 2019-05-24 RX ORDER — OXYCODONE AND ACETAMINOPHEN 5; 325 MG/1; MG/1
1 TABLET ORAL EVERY 4 HOURS
Refills: 0 | Status: DISCONTINUED | OUTPATIENT
Start: 2019-05-24 | End: 2019-05-24

## 2019-05-24 RX ORDER — ONDANSETRON 8 MG/1
4 TABLET, FILM COATED ORAL EVERY 6 HOURS
Refills: 0 | Status: DISCONTINUED | OUTPATIENT
Start: 2019-05-24 | End: 2019-06-08

## 2019-05-24 RX ORDER — OXYCODONE HYDROCHLORIDE 5 MG/1
10 TABLET ORAL ONCE
Refills: 0 | Status: DISCONTINUED | OUTPATIENT
Start: 2019-05-24 | End: 2019-05-24

## 2019-05-24 RX ORDER — SODIUM CHLORIDE 9 MG/ML
1000 INJECTION INTRAMUSCULAR; INTRAVENOUS; SUBCUTANEOUS
Refills: 0 | Status: DISCONTINUED | OUTPATIENT
Start: 2019-05-24 | End: 2019-06-08

## 2019-05-24 RX ADMIN — FENTANYL CITRATE 50 MICROGRAM(S): 50 INJECTION INTRAVENOUS at 10:39

## 2019-05-24 RX ADMIN — FENTANYL CITRATE 50 MICROGRAM(S): 50 INJECTION INTRAVENOUS at 11:30

## 2019-05-24 RX ADMIN — OXYCODONE HYDROCHLORIDE 10 MILLIGRAM(S): 5 TABLET ORAL at 15:51

## 2019-05-24 RX ADMIN — OXYCODONE HYDROCHLORIDE 5 MILLIGRAM(S): 5 TABLET ORAL at 10:44

## 2019-05-24 NOTE — ASU PATIENT PROFILE, ADULT - PMH
Back pain    Childhood asthma    H/O babesiosis  2006  H/O Clostridium difficile infection  1998  H/O measles  2006  H/O Cody spotted fever  2015  Herniated nucleus pulposus, lumbar  sacral  IBS (irritable bowel syndrome)    Kidney stone    Lyme disease  1998  Lymphadenopathy  retroperitoneal  Microscopic hematuria    Prediabetes    Spinal stenosis

## 2019-05-24 NOTE — ASU PATIENT PROFILE, ADULT - ANESTHESIA, PREVIOUS REACTION, PROFILE
Mom LVM questioning norditropin being denied by pharmacy.     Called mom back and explained that Norditropin has been denied by medicaid. Notified mom that we are awaiting whether treatment is still needed for pt. Mom gave verbal consent to appeal norditropin denial.   
none

## 2019-05-24 NOTE — ASU DISCHARGE PLAN (ADULT/PEDIATRIC) - CARE PROVIDER_API CALL
Clay Myers)  Surgery; Surgical Oncology  [address] [place] [address]  Phone: [phone]  Fax: [phone]  Follow Up Time:
no

## 2019-05-24 NOTE — ASU DISCHARGE PLAN (ADULT/PEDIATRIC) - PAIN MANAGEMENT
Take over the counter pain medication/please take tylenol or advil for pain as needed and perscribed

## 2019-05-28 LAB — SURGICAL PATHOLOGY STUDY: SIGNIFICANT CHANGE UP

## 2019-05-31 ENCOUNTER — LABORATORY RESULT (OUTPATIENT)
Age: 59
End: 2019-05-31

## 2019-06-03 ENCOUNTER — OUTPATIENT (OUTPATIENT)
Dept: OUTPATIENT SERVICES | Facility: HOSPITAL | Age: 59
LOS: 1 days | Discharge: HOME | End: 2019-06-03

## 2019-06-03 DIAGNOSIS — C81.00 NODULAR LYMPHOCYTE PREDOMINANT HODGKIN LYMPHOMA, UNSPECIFIED SITE: ICD-10-CM

## 2019-06-03 DIAGNOSIS — Z98.890 OTHER SPECIFIED POSTPROCEDURAL STATES: Chronic | ICD-10-CM

## 2019-06-03 DIAGNOSIS — Z90.89 ACQUIRED ABSENCE OF OTHER ORGANS: Chronic | ICD-10-CM

## 2019-06-04 ENCOUNTER — APPOINTMENT (OUTPATIENT)
Dept: SURGICAL ONCOLOGY | Facility: CLINIC | Age: 59
End: 2019-06-04
Payer: COMMERCIAL

## 2019-06-04 VITALS
DIASTOLIC BLOOD PRESSURE: 82 MMHG | OXYGEN SATURATION: 98 % | HEART RATE: 67 BPM | TEMPERATURE: 98.3 F | HEIGHT: 70 IN | WEIGHT: 156 LBS | RESPIRATION RATE: 16 BRPM | BODY MASS INDEX: 22.33 KG/M2 | SYSTOLIC BLOOD PRESSURE: 131 MMHG

## 2019-06-04 DIAGNOSIS — M48.00 SPINAL STENOSIS, SITE UNSPECIFIED: ICD-10-CM

## 2019-06-04 DIAGNOSIS — R19.00 INTRA-ABDOMINAL AND PELVIC SWELLING, MASS AND LUMP, UNSPECIFIED SITE: ICD-10-CM

## 2019-06-04 DIAGNOSIS — R33.9 RETENTION OF URINE, UNSPECIFIED: ICD-10-CM

## 2019-06-04 DIAGNOSIS — K58.9 IRRITABLE BOWEL SYNDROME WITHOUT DIARRHEA: ICD-10-CM

## 2019-06-04 DIAGNOSIS — R73.03 PREDIABETES: ICD-10-CM

## 2019-06-04 DIAGNOSIS — Z83.3 FAMILY HISTORY OF DIABETES MELLITUS: ICD-10-CM

## 2019-06-04 DIAGNOSIS — Z82.49 FAMILY HISTORY OF ISCHEMIC HEART DISEASE AND OTHER DISEASES OF THE CIRCULATORY SYSTEM: ICD-10-CM

## 2019-06-04 DIAGNOSIS — C85.16 UNSPECIFIED B-CELL LYMPHOMA, INTRAPELVIC LYMPH NODES: ICD-10-CM

## 2019-06-04 DIAGNOSIS — Z87.442 PERSONAL HISTORY OF URINARY CALCULI: ICD-10-CM

## 2019-06-04 DIAGNOSIS — M54.9 DORSALGIA, UNSPECIFIED: ICD-10-CM

## 2019-06-04 DIAGNOSIS — E11.9 TYPE 2 DIABETES MELLITUS WITHOUT COMPLICATIONS: ICD-10-CM

## 2019-06-04 DIAGNOSIS — Z88.0 ALLERGY STATUS TO PENICILLIN: ICD-10-CM

## 2019-06-04 DIAGNOSIS — M51.27 OTHER INTERVERTEBRAL DISC DISPLACEMENT, LUMBOSACRAL REGION: ICD-10-CM

## 2019-06-04 PROCEDURE — 99024 POSTOP FOLLOW-UP VISIT: CPT

## 2019-06-04 NOTE — HISTORY OF PRESENT ILLNESS
[de-identified] : 58 year male presents for an initial post op visit. \par Initially consulted 4/16/19 for retroperitoneal lymphadenopathy, referred by Dr. Sanchez. \par \par ---------------------------------------------------------------------------------------------------------------------------------\par HPI as per Dr. Sanchez:\par The patient was admitted to [facility] 2/1/19 - 2/2/19 with sharp right sided pain that radiated from his right flank to anterior abdomen. He denied any trauma or heavy lifting. His thoracic spine MRI was unremarkable and CT A/P on 2/1/19 was negative for pyelonephritis and nephroureterolithiasis, but showed gallbladder sludge or tiny stones. Patient had urinary retention in the ED, possibly in the setting of Dilaudid, and De Guzman was placed for a short period of time. \par \par Three weeks ago his back pain worsened and he was unable to sleep. His back pain worsens when he is laying down. He has lost about 7 pounds in the past month. Also reports dysuria, new since his hospital stay and De Guzman removal. He notes that it feels similar to symptoms he experienced with prostatitis in the past. He notes recently developed bad breath. No diarrhea. No CP. Chronic dry cough, been ongoing for years. No SOB. No N/V. He reports that last week he had a rash on his back with associated itching and burning. He reports being more stressed the past 2 months. No fevers or night sweats. No recurrent infections. \par \par Last colonoscopy was in 2016. Cystoscopy few years ago due to persistent hematuria which was normal.\par \par 2/2/19 RUQ sono- cholelithiasis w/o evidence of acute cholecystitis\par 2/1/19 kidney sono - no evidence of hydronephrosis. No evidence of renal calculi bilaterally. Simple cyst in L kidney\par 2/1/19 CT chest - No PE. Clear lungs. \par 2/6/19 urine cytology - negative for malignant cells\par 3/30/19 MRI / MRCP - enlarged hypo enhancing retroperitoneal aortocaval nodes, 1.6 x 4.5 cm (previously 1.6 cm), and 1.2 x 1.9 cm (previously 0.8 x 1 cm). Additional smaller retroperitoneal nodes also present.  Differential considerations of lymphoma/other lymphoproliferative process versus metastatic disease. \par 4/1/19 scrotal US - small R epididymal head cyst. Small left hydrocele. No evidence for testicular mass. \par PMHx: measles in 2007, No previous history of cancer, lumbar spinal stenosis, congenital disc herniation, prostatitis, Lyme disease in 1998, juliette mountain spotted fever in 2015, chicken pox in 1993, rhinoplasty in 1979. No medications, NKA. \par FMHx: cousin with lymphoma in early 40s, cousin with testicular cancer in his 50s, aunt lymphoma in 70s, uncle lung cancer but wife was a 2 pack smoker. \par SHx: , chiropractor.\par \par ------------------------------------------------------------------------------------------------------------------------------\par \par PET CT 4/11/19:\par 1) FDG avid retroperitoneal lymph nodes corresponding to findings seen on MRI, suspicious for malignancy, either lymphoma or metastatic disease. \par 2) Subcentimeter, minimally FDG avid right axillary lymph node is nonspecific, probably reactive.\par 3) 1.8 cm intramedullary stippled calcification right proximal humerus, probably benign enchondroma (consider MRI)\par \par CBC, CMP, LDH WNL, Negative UA\par \par Today he is with c/o 8-10 lb intentional weight loss over the past month due to calorie restriction, occasional night sweats and chills, no fevers, right lower back/right groin pain which is worse at night and when lying on that side. Denies abdominal pain, nausea or vomiting. States he has been experiencing loose stools due to anxiety. \par \par INTERVAL HISTORY:\par 5/24/19 - S/p robotic assisted laparoscopic resection of retroperitoneal mass. \par FINAL PATHOLOGY revealed FOLLICULAR B CELL LYMPHOMA, WHO GRADE 3 (Predominantly Large Cells). \par \par 6/4/19 - Today he is with c/o tenderness and soreness to the RLQ.  He has been getting good pain relief with Oxycodone PRN.  Tolerating PO intake without nausea or vomiting.  Denies fever or chills. Back pain has resolved since the surgery.

## 2019-06-04 NOTE — REASON FOR VISIT
[Adenopathy] : adenopathy [Post-Op] : a post-op for [Spouse] : spouse [FreeTextEntry2] : Newly diagnosed Follicular Lymphoma

## 2019-06-04 NOTE — CONSULT LETTER
[Dear  ___] : Dear  [unfilled], [Please see my note below.] : Please see my note below. [Consult Letter:] : I had the pleasure of evaluating your patient, [unfilled]. [Consult Closing:] : Thank you very much for allowing me to participate in the care of this patient.  If you have any questions, please do not hesitate to contact me. [Sincerely,] : Sincerely, [DrStorm  ___] : Dr. RAINES [FreeTextEntry3] : Clay Myers MD, MPH, FACS\par Surgical Oncology\par Assistant Professor of Surgery\par [facility] at [facility]

## 2019-06-04 NOTE — ASSESSMENT
[FreeTextEntry1] : 58 year gentleman with new onset retroperitoneal lymphadenopathy that has developed over a course of 7-8 weeks. He has no prior history of malignancy. 2/1/19 CT abd/pelvis was negative for any concerning findings. MRI/MRCP 3/30/19 showed enlarged retroperitoneal nodes, with one measuring 4.5 cm. Scrotal US was negative.\par \par PET CT 4/11/19:\par 1) FDG avid retroperitoneal lymph nodes corresponding to findings seen on MRI, suspicious for malignancy, either lymphoma or metastatic disease. \par \par 5/24/19 - S/p robotic assisted laparoscopic resection of retroperitoneal mass. \par FINAL PATHOLOGY revealed FOLLICULAR B CELL LYMPHOMA, WHO GRADE 3 (Predominantly Large Cells). \par \par 6/4/19 - Today he is with c/o tenderness and soreness to the RLQ.  He has been getting good pain relief with Oxycodone PRN.  Tolerating PO intake without nausea or vomiting.  Denies fever or chills. Back pain has resolved since the surgery. \par \par PLAN:\par 1) Chemotherapy as per Dr. Sanchez\par 2) RTO 3 months \par \par

## 2019-06-04 NOTE — PHYSICAL EXAM
[Normal] : well developed, well nourished, in no acute distress [de-identified] : healing trochar sites with no evidence of infection; mild tenderness to the RLQ but otherwise soft, ND, +BS ; No masses or hernias

## 2019-06-06 ENCOUNTER — APPOINTMENT (OUTPATIENT)
Dept: HEMATOLOGY ONCOLOGY | Facility: CLINIC | Age: 59
End: 2019-06-06
Payer: COMMERCIAL

## 2019-06-06 VITALS
BODY MASS INDEX: 22.19 KG/M2 | HEIGHT: 70 IN | HEART RATE: 91 BPM | DIASTOLIC BLOOD PRESSURE: 79 MMHG | SYSTOLIC BLOOD PRESSURE: 131 MMHG | OXYGEN SATURATION: 96 % | WEIGHT: 155 LBS | TEMPERATURE: 98.4 F

## 2019-06-06 PROCEDURE — 99214 OFFICE O/P EST MOD 30 MIN: CPT

## 2019-06-06 RX ORDER — METHOCARBAMOL 500 MG/1
500 TABLET, FILM COATED ORAL
Qty: 45 | Refills: 0 | Status: DISCONTINUED | COMMUNITY
Start: 2019-02-02 | End: 2019-06-06

## 2019-06-06 RX ORDER — OXYCODONE AND ACETAMINOPHEN 10; 325 MG/1; MG/1
10-325 TABLET ORAL
Qty: 12 | Refills: 0 | Status: DISCONTINUED | COMMUNITY
Start: 2019-05-24 | End: 2019-06-06

## 2019-06-06 RX ORDER — CYCLOBENZAPRINE HYDROCHLORIDE 5 MG/1
5 TABLET, FILM COATED ORAL
Qty: 14 | Refills: 0 | Status: DISCONTINUED | COMMUNITY
Start: 2019-02-02 | End: 2019-06-06

## 2019-06-14 ENCOUNTER — RESULT REVIEW (OUTPATIENT)
Age: 59
End: 2019-06-14

## 2019-06-24 NOTE — ASSESSMENT
[FreeTextEntry1] : 58 year gentleman with new onset retroperitoneal lymphadenopathy s/p excisional biopsy revealing follicular lymphoma, grade 3A, stage I. He has no B symptoms. FLIPI 0 - low risk.\par \par Today we discussed the available radiographic and pathologic data in detail.  We also discussed the natural history of the disease, as well as management options. Our discussion during the visit was limited as distinction between grade 3A vs 3B was pending.  \par \par Pathology was reviewed at [facility], and has confirmed 3A follicular lymphoma.  For stage I contiguous follicular lymphoma, standard of care is ISRT.  He will be referred to rad-onc for discussion.\par \par Follow up with me in 2 months as scheduled. \par \par

## 2019-06-24 NOTE — ADDENDUM
[FreeTextEntry1] : I, Maggie Guzman, acted solely as a scribe for Dr. Concepción Sanchez on this date 6/6/19.

## 2019-06-24 NOTE — PHYSICAL EXAM
[Normal] : affect appropriate [Fully active, able to carry on all pre-disease performance without restriction] : Status 0 - Fully active, able to carry on all pre-disease performance without restriction [de-identified] : supple [de-identified] : No edema [de-identified] : S1/S2 [de-identified] : clear bilaterally [de-identified] : soft, non tender, non distended [de-identified] : No back pain on palpation [de-identified] : no inguinal lymphadenopathy

## 2019-06-24 NOTE — HISTORY OF PRESENT ILLNESS
[de-identified] : Patient presenting for follow up.\par S/p robotic assisted laparoscopic retroperitoneal mass on 5/24/19 revealing follicular B cell lymphoma, WHO grade 3.\par Has seen Dr. Myers post op. \par Intermittent night sweats. \par Has noted 20 lb over the course of 2 months. However, has also made diet modifications and increased anxiety. \par No diarrhea or constipation \par No fever or chills\par \par 4/11/19 PET CT IMPRESSION: \par 1. FDG avid retroperitoneal lymph nodes corresponding to findings seen on MRI, suspicious for malignancy, either lymphoma or metastatic disease. \par 2. Subcentimeter, minimally FDG avid right axillary lymph node is nonspecific, probably reactive. \par 3. 1.8 cm intramedullary stippled calcification right proximal humerus, probably benign enchondroma. Consider further evaluation with radiographs and/or MRI. \par  [de-identified] : Mr. Puri presents with retroperitoneal lymphadenopathy.\par \par The patient was admitted to [facility] 2/1/19 - 2/2/19 with sharp right sided pain that radiated from his right flank to anterior abdomen. He denied any trauma or heavy lifting. His thoracic spine MRI was unremarkable and CT A/P on 2/1/19 was negative for pyelonephritis and nephroureterolithiasis, but showed gallbladder sludge or tiny stones. Patient had urinary retention in the ED, possibly in the setting of Dilaudid, and De Guzman was placed for a short period of time. \par \par Three weeks ago his back pain worsened and he was unable to sleep. His back pain worsens when he is laying down. He has lost about 7 pounds in the past month. Also reports dysuria, new since his hospital stay and De Guzman removal. He notes that it feels similar to symptoms he experienced with prostatitis in the past. He notes recently developed bad breath. No diarrhea. No CP. Chronic dry cough, been ongoing for years. No SOB. No N/V. He reports that last week he had a rash on his back with associated itching and burning. He reports being more stressed the past 2 months. No fevers or night sweats. No recurrent infections. \par \par Last colonoscopy was in 2016. Cystoscopy few years ago due to persistent hematuria which was normal.\par \par 2/2/19 RUQ sono- cholelithiasis w/o evidence of acute cholecystitis\par 2/1/19 kidney sono - no evidence of hydronephrosis. No evidence of renal calculi bilaterally. Simple cyst in L kidney\par 2/1/19 CT chest - No PE. Clear lungs. \par 2/6/19 urine cytology - negative for malignant cells\par 3/30/19 MRI / MRCP - enlarged hypoenhancing retroperitoneal  aortocaval nodes, 1.6 x 4.5 cm (previously 1.6 cm), and 1.2 x 1.9 cm (previously 0.8 x 1 cm). Additional smaller retroperitoneal nodes also present. \par 4/1/19 scrotal US - small R epididymal head cyst. Small left hydrocele. No evidence for testicular mass. \par 5/24/19 retroperitoneal mass resection pathology: follicular B cell lymphoma, WHO grade 3.\par PMHx: measles in 2007, No previous history of cancer, lumbar spinal stenosis, congenital disc herniation, prostatitis\par FMHx: cousin with lymphoma in early 40s, cousin with testicular cancer in his 50s, aunt lymphoma in 70s, uncle lung cancer but wife was a 2 pack smoker. \par SHx: , chiropractor

## 2019-06-25 ENCOUNTER — LABORATORY RESULT (OUTPATIENT)
Age: 59
End: 2019-06-25

## 2019-07-02 ENCOUNTER — OUTPATIENT (OUTPATIENT)
Dept: OUTPATIENT SERVICES | Facility: HOSPITAL | Age: 59
LOS: 1 days | Discharge: HOME | End: 2019-07-02
Payer: COMMERCIAL

## 2019-07-02 ENCOUNTER — APPOINTMENT (OUTPATIENT)
Dept: RADIATION ONCOLOGY | Facility: CLINIC | Age: 59
End: 2019-07-02
Payer: COMMERCIAL

## 2019-07-02 VITALS
BODY MASS INDEX: 22.79 KG/M2 | RESPIRATION RATE: 16 BRPM | HEART RATE: 69 BPM | TEMPERATURE: 98 F | OXYGEN SATURATION: 97 % | HEIGHT: 70 IN | DIASTOLIC BLOOD PRESSURE: 80 MMHG | SYSTOLIC BLOOD PRESSURE: 132 MMHG | WEIGHT: 159.2 LBS

## 2019-07-02 DIAGNOSIS — Z98.890 OTHER SPECIFIED POSTPROCEDURAL STATES: Chronic | ICD-10-CM

## 2019-07-02 DIAGNOSIS — Z90.89 ACQUIRED ABSENCE OF OTHER ORGANS: Chronic | ICD-10-CM

## 2019-07-02 PROCEDURE — 99205 OFFICE O/P NEW HI 60 MIN: CPT | Mod: 25

## 2019-07-02 PROCEDURE — 77263 THER RADIOLOGY TX PLNG CPLX: CPT

## 2019-07-02 NOTE — LETTER CLOSING
[Consult Closing:] : Thank you for allowing me to participate in the care of this patient.  If you have any questions, please do not hesitate to contact me. [Sincerely yours,] : Sincerely yours, [FreeTextEntry3] : Floyd Trammell MD\par Attending Physician\par Department of Radiation Medicine\par [facility], [facility]\par \par \par [employer] [employer] \par [employer] [facility]\par

## 2019-07-02 NOTE — REVIEW OF SYSTEMS
[Fatigue] : fatigue [Recent Change In Weight] : ~T recent weight change [Constipation] : constipation [Negative] : Allergic/Immunologic [Diarrhea] : no diarrhea [Vomiting] : no vomiting [FreeTextEntry9] : lumbar spinal stenosis with radiculopathy [FreeTextEntry8] : Hx prostatitis, kidney stones

## 2019-07-02 NOTE — HISTORY OF PRESENT ILLNESS
[FreeTextEntry1] : 58 year-old gentleman presents for radiation medicine consultation regarding newly diagnosed follicular lymphoma.\par \par He initially presented to and was admitted at [facility] 2/1/19 - 2/2/19 with sharp right sided pain that radiated from his right flank to anterior abdomen.  He denied any trauma or heavy lifting.  Thoracic spine MRI was unremarkable and CT A/P on 2/1/19 was negative for pyelonephritis and nephroureterolithiasis, but showed gallbladder sludge or tiny stones. He had urinary retention in the ED, possibly in the setting of Dilaudid, and De Guzman was placed for a short period of time. \par \par His back pain worsened to the point where it disrupted sleep, and he underwent further diagnostic evaluation including MRI abdomen/MRCP on 3/30/19, which showed enlarged hypoenhancing retroperitoneal aortocaval nodes, 1.6 x 4.5 cm (previously 1.6 cm), and 1.2 x 1.9 cm (previously 0.8 x 1 cm). Additional smaller retroperitoneal nodes also present. \par \par PET CT 4/11/19 showed FDG-avid retroperitoneal lymph nodes corresponding to findings seen on MRI, suspicious for malignancy, either lymphoma or metastatic disease.  Also noted were subcentimeter, minimally FDG avid right axillary lymph node is nonspecific, felt to be reactive.\par \par He underwent robotic assisted laparoscopic biopsy/resection of retroperitoneal mass on 5/24/19, with pathology showing follicular b-cell lymphoma, WHO grade 3A, predominantly large cells.\par \par More recently, his has noted persistent back pain worse when laying down.  He reports ~15 pounds since 2/2019.  He also notes intermittent dysuria and non-bacterial prostatitis, and irregular bowels with more constipation.  He denies fevers, chills, or night sweats.\par

## 2019-07-02 NOTE — DISEASE MANAGEMENT
[I] : I [Clinical] : TNM Stage: c [FreeTextEntry4] : follicular lymphoma, grade 3A [TTNM] : 1 [NTNM] : 0 [MTNM] : 0

## 2019-07-02 NOTE — PHYSICAL EXAM
[Normal] : normoactive bowel sounds, soft and nontender, no hepatosplenomegaly or masses appreciated [de-identified] : mild tenderness of the left flank

## 2019-07-02 NOTE — VITALS
[NoTreatment Scheduled] : no treatment scheduled [Maximal Pain Intensity: 0/10] : 0/10 [Least Pain Intensity: 0/10] : 0/10 [7 - Distress Level] : Distress Level: 7 [Referred Patient  to social work for follow-up] : Patient was referred to social work for follow-up [90: Able to carry normal activity; minor signs or symptoms of disease.] : 90: Able to carry normal activity; minor signs or symptoms of disease.

## 2019-08-02 ENCOUNTER — OUTPATIENT (OUTPATIENT)
Dept: OUTPATIENT SERVICES | Facility: HOSPITAL | Age: 59
LOS: 1 days | Discharge: HOME | End: 2019-08-02

## 2019-08-02 DIAGNOSIS — Z98.890 OTHER SPECIFIED POSTPROCEDURAL STATES: Chronic | ICD-10-CM

## 2019-08-02 DIAGNOSIS — C82.00 FOLLICULAR LYMPHOMA GRADE I, UNSPECIFIED SITE: ICD-10-CM

## 2019-08-02 DIAGNOSIS — Z90.89 ACQUIRED ABSENCE OF OTHER ORGANS: Chronic | ICD-10-CM

## 2019-08-06 ENCOUNTER — APPOINTMENT (OUTPATIENT)
Dept: HEMATOLOGY ONCOLOGY | Facility: CLINIC | Age: 59
End: 2019-08-06

## 2019-09-05 ENCOUNTER — LABORATORY RESULT (OUTPATIENT)
Age: 59
End: 2019-09-05

## 2019-09-09 ENCOUNTER — APPOINTMENT (OUTPATIENT)
Dept: FAMILY MEDICINE | Facility: CLINIC | Age: 59
End: 2019-09-09
Payer: COMMERCIAL

## 2019-09-09 VITALS
HEART RATE: 78 BPM | OXYGEN SATURATION: 97 % | SYSTOLIC BLOOD PRESSURE: 132 MMHG | DIASTOLIC BLOOD PRESSURE: 91 MMHG | RESPIRATION RATE: 16 BRPM | BODY MASS INDEX: 22.76 KG/M2 | WEIGHT: 159 LBS | HEIGHT: 70 IN

## 2019-09-09 PROCEDURE — 99213 OFFICE O/P EST LOW 20 MIN: CPT

## 2019-09-09 RX ORDER — GLYBURIDE AND METFORMIN HYDROCHLORIDE 1.25; 25 MG/1; MG/1
1.25-25 TABLET, FILM COATED ORAL DAILY
Qty: 30 | Refills: 5 | Status: DISCONTINUED | COMMUNITY
Start: 2019-04-02 | End: 2019-09-09

## 2019-09-09 NOTE — PHYSICAL EXAM
[Flat] : flat [Rowley's] : a positive Rowley's sign [Soft, Nontender] : the abdomen was soft and nontender [Obturator] : a negative obturator sign [Rovsing's] : a negative Rovsing's sign [Psoas] : a negative psoas sign [No Mass] : no masses were palpated [Right] : right CVA tenderness present [Normal] : affect was normal and insight and judgment were intact

## 2019-09-09 NOTE — HISTORY OF PRESENT ILLNESS
[FreeTextEntry8] : BLANK MARKS is a 59 year old male who presents with complains of pain on his right upper quadrant. The pain radiates to the right side of his back. He is nauseous and has poor appetite. He lost 7 lbs, \par He has been going through a serious of procedures. He received chemotherapy in [place], He was put through a process of  hyperthermia and  hypoglycemia before the administration of medications. I reviewed his blood work recently done. \par We discussed repeating his scans since in the past his lymphadenopathy was very aggressive, he prefers to have an MRI. \par  \par

## 2019-09-12 ENCOUNTER — APPOINTMENT (OUTPATIENT)
Dept: SURGICAL ONCOLOGY | Facility: CLINIC | Age: 59
End: 2019-09-12

## 2019-10-10 ENCOUNTER — APPOINTMENT (OUTPATIENT)
Dept: SURGICAL ONCOLOGY | Facility: CLINIC | Age: 59
End: 2019-10-10

## 2019-10-14 NOTE — ASSESSMENT
[FreeTextEntry1] : 59 year gentleman with new onset retroperitoneal lymphadenopathy that has developed over a course of 7-8 weeks. He has no prior history of malignancy. 2/1/19 CT abd/pelvis was negative for any concerning findings. MRI/MRCP 3/30/19 showed enlarged retroperitoneal nodes, with one measuring 4.5 cm. Scrotal US was negative.\par \par PET CT 4/11/19:\par 1) FDG avid retroperitoneal lymph nodes corresponding to findings seen on MRI, suspicious for malignancy, either lymphoma or metastatic disease. \par \par 5/24/19 - S/p robotic assisted laparoscopic resection of retroperitoneal mass. \par FINAL PATHOLOGY revealed FOLLICULAR B CELL LYMPHOMA, WHO GRADE 3 (Predominantly Large Cells). \par \par PLAN:\par 1)  Continue f/u with Dr. Sanchez

## 2019-10-14 NOTE — HISTORY OF PRESENT ILLNESS
[de-identified] : 59 year male presents for a 3 month follow up visit. \par Initially consulted 4/16/19 for retroperitoneal lymphadenopathy, referred by Dr. Sanchez. \par \par ---------------------------------------------------------------------------------------------------------------------------------\par HPI as per Dr. Sanchez:\par The patient was admitted to [facility] 2/1/19 - 2/2/19 with sharp right sided pain that radiated from his right flank to anterior abdomen. He denied any trauma or heavy lifting. His thoracic spine MRI was unremarkable and CT A/P on 2/1/19 was negative for pyelonephritis and nephroureterolithiasis, but showed gallbladder sludge or tiny stones. Patient had urinary retention in the ED, possibly in the setting of Dilaudid, and De Guzman was placed for a short period of time. \par \par Three weeks ago his back pain worsened and he was unable to sleep. His back pain worsens when he is laying down. He has lost about 7 pounds in the past month. Also reports dysuria, new since his hospital stay and De Guzman removal. He notes that it feels similar to symptoms he experienced with prostatitis in the past. He notes recently developed bad breath. No diarrhea. No CP. Chronic dry cough, been ongoing for years. No SOB. No N/V. He reports that last week he had a rash on his back with associated itching and burning. He reports being more stressed the past 2 months. No fevers or night sweats. No recurrent infections. \par \par Last colonoscopy was in 2016. Cystoscopy few years ago due to persistent hematuria which was normal.\par \par 2/2/19 RUQ sono- cholelithiasis w/o evidence of acute cholecystitis\par 2/1/19 kidney sono - no evidence of hydronephrosis. No evidence of renal calculi bilaterally. Simple cyst in L kidney\par 2/1/19 CT chest - No PE. Clear lungs. \par 2/6/19 urine cytology - negative for malignant cells\par 3/30/19 MRI / MRCP - enlarged hypo enhancing retroperitoneal aortocaval nodes, 1.6 x 4.5 cm (previously 1.6 cm), and 1.2 x 1.9 cm (previously 0.8 x 1 cm). Additional smaller retroperitoneal nodes also present.  Differential considerations of lymphoma/other lymphoproliferative process versus metastatic disease. \par 4/1/19 scrotal US - small R epididymal head cyst. Small left hydrocele. No evidence for testicular mass. \par PMHx: measles in 2007, No previous history of cancer, lumbar spinal stenosis, congenital disc herniation, prostatitis, Lyme disease in 1998, [place] spotted fever in 2015, chicken pox in 1993, rhinoplasty in 1979. No medications, NKA. \par FMHx: cousin with lymphoma in early 40s, cousin with testicular cancer in his 50s, aunt lymphoma in 70s, uncle lung cancer but wife was a 2 pack smoker. \par SHx: , chiropractor.\par \par ------------------------------------------------------------------------------------------------------------------------------\par \par PET CT 4/11/19:\par 1) FDG avid retroperitoneal lymph nodes corresponding to findings seen on MRI, suspicious for malignancy, either lymphoma or metastatic disease. \par 2) Subcentimeter, minimally FDG avid right axillary lymph node is nonspecific, probably reactive.\par 3) 1.8 cm intramedullary stippled calcification right proximal humerus, probably benign enchondroma (consider MRI)\par \par CBC, CMP, LDH WNL, Negative UA\par \par Today he is with c/o 8-10 lb intentional weight loss over the past month due to calorie restriction, occasional night sweats and chills, no fevers, right lower back/right groin pain which is worse at night and when lying on that side. Denies abdominal pain, nausea or vomiting. States he has been experiencing loose stools due to anxiety. \par \par INTERVAL HISTORY:\par 5/24/19 - S/p robotic assisted laparoscopic resection of retroperitoneal mass. \par FINAL PATHOLOGY revealed FOLLICULAR B CELL LYMPHOMA, WHO GRADE 3 (Predominantly Large Cells). \par \par 6/4/19 - Today he is with c/o tenderness and soreness to the RLQ.  He has been getting good pain relief with Oxycodone PRN.  Tolerating PO intake without nausea or vomiting.  Denies fever or chills. Back pain has resolved since the surgery.\par \par 9/12/19-  He consulted with Dr. Sanchez (Heme-onc) and Dr. Trammell (Rad-onc).

## 2019-10-14 NOTE — REASON FOR VISIT
[Follow-Up Visit] : a follow-up visit for [Adenopathy] : adenopathy [Spouse] : spouse [FreeTextEntry2] : Newly diagnosed Follicular Lymphoma

## 2019-10-15 ENCOUNTER — APPOINTMENT (OUTPATIENT)
Dept: MRI IMAGING | Facility: CLINIC | Age: 59
End: 2019-10-15

## 2019-10-21 ENCOUNTER — FORM ENCOUNTER (OUTPATIENT)
Age: 59
End: 2019-10-21

## 2019-10-22 ENCOUNTER — APPOINTMENT (OUTPATIENT)
Dept: NUCLEAR MEDICINE | Facility: CLINIC | Age: 59
End: 2019-10-22
Payer: COMMERCIAL

## 2019-10-22 ENCOUNTER — OUTPATIENT (OUTPATIENT)
Dept: OUTPATIENT SERVICES | Facility: HOSPITAL | Age: 59
LOS: 1 days | End: 2019-10-22

## 2019-10-22 DIAGNOSIS — Z98.890 OTHER SPECIFIED POSTPROCEDURAL STATES: Chronic | ICD-10-CM

## 2019-10-22 DIAGNOSIS — Z90.89 ACQUIRED ABSENCE OF OTHER ORGANS: Chronic | ICD-10-CM

## 2019-10-22 DIAGNOSIS — R59.0 LOCALIZED ENLARGED LYMPH NODES: ICD-10-CM

## 2019-10-22 PROCEDURE — 78815 PET IMAGE W/CT SKULL-THIGH: CPT | Mod: 26,PS

## 2019-12-19 ENCOUNTER — APPOINTMENT (OUTPATIENT)
Dept: FAMILY MEDICINE | Facility: CLINIC | Age: 59
End: 2019-12-19
Payer: COMMERCIAL

## 2019-12-19 VITALS
TEMPERATURE: 98.7 F | OXYGEN SATURATION: 98 % | BODY MASS INDEX: 25.18 KG/M2 | DIASTOLIC BLOOD PRESSURE: 87 MMHG | WEIGHT: 170 LBS | SYSTOLIC BLOOD PRESSURE: 145 MMHG | HEART RATE: 79 BPM | HEIGHT: 69 IN

## 2019-12-19 DIAGNOSIS — R20.2 PARESTHESIA OF SKIN: ICD-10-CM

## 2019-12-19 DIAGNOSIS — M54.5 LOW BACK PAIN: ICD-10-CM

## 2019-12-19 PROCEDURE — 99214 OFFICE O/P EST MOD 30 MIN: CPT

## 2020-01-21 ENCOUNTER — LABORATORY RESULT (OUTPATIENT)
Age: 60
End: 2020-01-21

## 2020-01-22 LAB
25(OH)D3 SERPL-MCNC: 65.4 NG/ML
ALBUMIN SERPL ELPH-MCNC: 4.8 G/DL
ALP BLD-CCNC: 61 U/L
ALT SERPL-CCNC: 18 U/L
ANION GAP SERPL CALC-SCNC: 14 MMOL/L
APPEARANCE: CLEAR
AST SERPL-CCNC: 17 U/L
B BURGDOR AB SER-IMP: ABNORMAL
B BURGDOR IGG+IGM SER QL IB: NORMAL
B BURGDOR IGG+IGM SER QL: 0.99 INDEX
BASOPHILS # BLD AUTO: 0.08 K/UL
BASOPHILS NFR BLD AUTO: 1.4 %
BILIRUB SERPL-MCNC: 0.7 MG/DL
BILIRUBIN URINE: NEGATIVE
BLOOD URINE: NEGATIVE
BUN SERPL-MCNC: 21 MG/DL
CALCIUM SERPL-MCNC: 9.5 MG/DL
CHLORIDE SERPL-SCNC: 101 MMOL/L
CHOLEST SERPL-MCNC: 223 MG/DL
CHOLEST/HDLC SERPL: 3.9 RATIO
CO2 SERPL-SCNC: 25 MMOL/L
COLOR: YELLOW
CREAT SERPL-MCNC: 1.14 MG/DL
CREAT SPEC-SCNC: 119 MG/DL
CRP SERPL-MCNC: 0.1 MG/DL
EOSINOPHIL # BLD AUTO: 0.24 K/UL
EOSINOPHIL NFR BLD AUTO: 4.2 %
ERYTHROCYTE [SEDIMENTATION RATE] IN BLOOD BY WESTERGREN METHOD: 17 MM/HR
ESTIMATED AVERAGE GLUCOSE: 120 MG/DL
FERRITIN SERPL-MCNC: 322 NG/ML
FOLATE SERPL-MCNC: 19.2 NG/ML
GGT SERPL-CCNC: 15 U/L
GLUCOSE QUALITATIVE U: NEGATIVE
GLUCOSE SERPL-MCNC: 111 MG/DL
HBA1C MFR BLD HPLC: 5.8 %
HCT VFR BLD CALC: 46 %
HDLC SERPL-MCNC: 57 MG/DL
HGB BLD-MCNC: 15.5 G/DL
IMM GRANULOCYTES NFR BLD AUTO: 1.7 %
KETONES URINE: NEGATIVE
LDH SERPL-CCNC: 170 U/L
LDLC SERPL CALC-MCNC: 150 MG/DL
LEUKOCYTE ESTERASE URINE: NEGATIVE
LYMPHOCYTES # BLD AUTO: 1.95 K/UL
LYMPHOCYTES NFR BLD AUTO: 34.1 %
MAN DIFF?: NORMAL
MCHC RBC-ENTMCNC: 29.3 PG
MCHC RBC-ENTMCNC: 33.7 GM/DL
MCV RBC AUTO: 87 FL
MICROALBUMIN 24H UR DL<=1MG/L-MCNC: <1.2 MG/DL
MICROALBUMIN/CREAT 24H UR-RTO: NORMAL MG/G
MONOCYTES # BLD AUTO: 0.69 K/UL
MONOCYTES NFR BLD AUTO: 12.1 %
NEUTROPHILS # BLD AUTO: 2.66 K/UL
NEUTROPHILS NFR BLD AUTO: 46.5 %
NITRITE URINE: NEGATIVE
PH URINE: 6
PLATELET # BLD AUTO: 436 K/UL
POTASSIUM SERPL-SCNC: 4.5 MMOL/L
PROT SERPL-MCNC: 7.3 G/DL
PROTEIN URINE: NEGATIVE
PSA FREE FLD-MCNC: 34 %
PSA FREE SERPL-MCNC: 0.61 NG/ML
PSA SERPL-MCNC: 1.81 NG/ML
RBC # BLD: 5.29 M/UL
RBC # FLD: 12.5 %
SODIUM SERPL-SCNC: 141 MMOL/L
SPECIFIC GRAVITY URINE: 1.02
T3RU NFR SERPL: 0.9 TBI
T4 FREE SERPL-MCNC: 1.4 NG/DL
TRIGL SERPL-MCNC: 80 MG/DL
TSH SERPL-ACNC: 2.56 UIU/ML
UROBILINOGEN URINE: NORMAL
VIT B12 SERPL-MCNC: 1160 PG/ML
WBC # FLD AUTO: 5.72 K/UL

## 2020-01-23 LAB
R RICKETTSI IGG CSF-ACNC: NEGATIVE
R RICKETTSI IGM CSF-ACNC: 0.17 INDEX

## 2020-01-28 LAB
ANAPLASMA PHAGOCYTO IGM COMENT: NORMAL
ANAPLASMA PHAGOCYTO IGM COMMENT: NORMAL
ANAPLASMA PHAGOCYTOPHILIA IGG ANTIBODIES: NORMAL
ANAPLASMA PHAGOCYTOPHILIA IGM ANTIBODIES: NORMAL
B MICROTI AB SER QL: NORMAL
BABESIA ANTIBODIES, IGG: NORMAL
BABESIA ANTIBODIES, IGM: NORMAL
EHRLICIA CHAFFEENIS IGG ANTIBODIES: NORMAL
EHRLICIA CHAFFEENIS IGG COMMENT: NORMAL
EHRLICIA CHAFFEENIS IGG INTERP: NORMAL
EHRLICIA CHAFFEENIS IGM ANTIBODIES: NORMAL
LDH SERPL-CCNC: 144 U/L
LDH1 CFR SERPL ELPH: 19 %
LDH2 CFR SERPL ELPH: 29 %
LDH3 CFR SERPL ELPH: 21 %
LDH4 CFR SERPL ELPH: 11 %
LDH5 CFR SERPL ELPH: 19 %

## 2020-03-12 ENCOUNTER — NON-APPOINTMENT (OUTPATIENT)
Age: 60
End: 2020-03-12

## 2020-05-22 ENCOUNTER — APPOINTMENT (OUTPATIENT)
Dept: FAMILY MEDICINE | Facility: CLINIC | Age: 60
End: 2020-05-22
Payer: COMMERCIAL

## 2020-05-22 DIAGNOSIS — R04.0 EPISTAXIS: ICD-10-CM

## 2020-05-22 PROCEDURE — 99214 OFFICE O/P EST MOD 30 MIN: CPT | Mod: 95

## 2020-05-22 RX ORDER — CIPROFLOXACIN HYDROCHLORIDE 500 MG/1
500 TABLET, FILM COATED ORAL TWICE DAILY
Qty: 28 | Refills: 0 | Status: DISCONTINUED | COMMUNITY
Start: 2020-03-12 | End: 2020-05-22

## 2020-06-18 ENCOUNTER — TRANSCRIPTION ENCOUNTER (OUTPATIENT)
Age: 60
End: 2020-06-18

## 2020-08-11 ENCOUNTER — LABORATORY RESULT (OUTPATIENT)
Age: 60
End: 2020-08-11

## 2020-10-01 ENCOUNTER — APPOINTMENT (OUTPATIENT)
Dept: FAMILY MEDICINE | Facility: CLINIC | Age: 60
End: 2020-10-01
Payer: COMMERCIAL

## 2020-10-01 ENCOUNTER — LABORATORY RESULT (OUTPATIENT)
Age: 60
End: 2020-10-01

## 2020-10-01 VITALS
OXYGEN SATURATION: 98 % | DIASTOLIC BLOOD PRESSURE: 90 MMHG | RESPIRATION RATE: 16 BRPM | HEIGHT: 70 IN | TEMPERATURE: 98 F | BODY MASS INDEX: 24.05 KG/M2 | SYSTOLIC BLOOD PRESSURE: 146 MMHG | HEART RATE: 79 BPM | WEIGHT: 168 LBS

## 2020-10-01 DIAGNOSIS — B37.0 CANDIDAL STOMATITIS: ICD-10-CM

## 2020-10-01 PROCEDURE — 36415 COLL VENOUS BLD VENIPUNCTURE: CPT

## 2020-10-01 PROCEDURE — 99214 OFFICE O/P EST MOD 30 MIN: CPT | Mod: 25

## 2020-10-02 ENCOUNTER — TRANSCRIPTION ENCOUNTER (OUTPATIENT)
Age: 60
End: 2020-10-02

## 2020-10-02 LAB
25(OH)D3 SERPL-MCNC: 57 NG/ML
ALBUMIN SERPL ELPH-MCNC: 4.8 G/DL
ALP BLD-CCNC: 60 U/L
ALT SERPL-CCNC: 18 U/L
ANION GAP SERPL CALC-SCNC: 17 MMOL/L
AST SERPL-CCNC: 19 U/L
BASOPHILS # BLD AUTO: 0.08 K/UL
BASOPHILS NFR BLD AUTO: 1.3 %
BILIRUB SERPL-MCNC: 0.5 MG/DL
BUN SERPL-MCNC: 18 MG/DL
CALCIUM SERPL-MCNC: 9.5 MG/DL
CHLORIDE SERPL-SCNC: 101 MMOL/L
CHOLEST SERPL-MCNC: 233 MG/DL
CHOLEST/HDLC SERPL: 4 RATIO
CO2 SERPL-SCNC: 22 MMOL/L
CREAT SERPL-MCNC: 1.18 MG/DL
EOSINOPHIL # BLD AUTO: 0.11 K/UL
EOSINOPHIL NFR BLD AUTO: 1.8 %
ESTIMATED AVERAGE GLUCOSE: 120 MG/DL
FERRITIN SERPL-MCNC: 230 NG/ML
FOLATE SERPL-MCNC: >20 NG/ML
GLUCOSE SERPL-MCNC: 108 MG/DL
HBA1C MFR BLD HPLC: 5.8 %
HCT VFR BLD CALC: 47.5 %
HDLC SERPL-MCNC: 59 MG/DL
HGB BLD-MCNC: 15 G/DL
IMM GRANULOCYTES NFR BLD AUTO: 0.5 %
LDLC SERPL CALC-MCNC: 151 MG/DL
LYMPHOCYTES # BLD AUTO: 1.52 K/UL
LYMPHOCYTES NFR BLD AUTO: 25.2 %
MAN DIFF?: NORMAL
MCHC RBC-ENTMCNC: 29 PG
MCHC RBC-ENTMCNC: 31.6 GM/DL
MCV RBC AUTO: 91.7 FL
MONOCYTES # BLD AUTO: 0.59 K/UL
MONOCYTES NFR BLD AUTO: 9.8 %
NEUTROPHILS # BLD AUTO: 3.71 K/UL
NEUTROPHILS NFR BLD AUTO: 61.4 %
PLATELET # BLD AUTO: 273 K/UL
POTASSIUM SERPL-SCNC: 4.7 MMOL/L
PROT SERPL-MCNC: 7.1 G/DL
PSA FREE FLD-MCNC: 36 %
PSA FREE SERPL-MCNC: 0.56 NG/ML
PSA SERPL-MCNC: 1.58 NG/ML
RBC # BLD: 5.18 M/UL
RBC # FLD: 13.2 %
SODIUM SERPL-SCNC: 141 MMOL/L
T3RU NFR SERPL: 0.9 TBI
T4 FREE SERPL-MCNC: 1.3 NG/DL
TRIGL SERPL-MCNC: 117 MG/DL
TSH SERPL-ACNC: 2.05 UIU/ML
VIT B12 SERPL-MCNC: 894 PG/ML
WBC # FLD AUTO: 6.04 K/UL

## 2020-10-04 ENCOUNTER — TRANSCRIPTION ENCOUNTER (OUTPATIENT)
Age: 60
End: 2020-10-04

## 2020-10-04 PROBLEM — B37.0 THRUSH: Status: ACTIVE | Noted: 2020-10-01

## 2020-10-06 ENCOUNTER — EMERGENCY (EMERGENCY)
Facility: HOSPITAL | Age: 60
LOS: 0 days | Discharge: HOME | End: 2020-10-06
Attending: STUDENT IN AN ORGANIZED HEALTH CARE EDUCATION/TRAINING PROGRAM
Payer: COMMERCIAL

## 2020-10-06 VITALS
OXYGEN SATURATION: 100 % | HEART RATE: 65 BPM | DIASTOLIC BLOOD PRESSURE: 89 MMHG | SYSTOLIC BLOOD PRESSURE: 139 MMHG | TEMPERATURE: 98 F | RESPIRATION RATE: 17 BRPM

## 2020-10-06 VITALS
SYSTOLIC BLOOD PRESSURE: 128 MMHG | TEMPERATURE: 98 F | OXYGEN SATURATION: 100 % | HEART RATE: 74 BPM | DIASTOLIC BLOOD PRESSURE: 78 MMHG | RESPIRATION RATE: 18 BRPM

## 2020-10-06 DIAGNOSIS — Z90.89 ACQUIRED ABSENCE OF OTHER ORGANS: Chronic | ICD-10-CM

## 2020-10-06 DIAGNOSIS — Z98.890 OTHER SPECIFIED POSTPROCEDURAL STATES: Chronic | ICD-10-CM

## 2020-10-06 DIAGNOSIS — R10.32 LEFT LOWER QUADRANT PAIN: ICD-10-CM

## 2020-10-06 DIAGNOSIS — Z87.19 PERSONAL HISTORY OF OTHER DISEASES OF THE DIGESTIVE SYSTEM: ICD-10-CM

## 2020-10-06 DIAGNOSIS — M48.00 SPINAL STENOSIS, SITE UNSPECIFIED: ICD-10-CM

## 2020-10-06 DIAGNOSIS — R19.7 DIARRHEA, UNSPECIFIED: ICD-10-CM

## 2020-10-06 DIAGNOSIS — Z85.72 PERSONAL HISTORY OF NON-HODGKIN LYMPHOMAS: ICD-10-CM

## 2020-10-06 DIAGNOSIS — R11.2 NAUSEA WITH VOMITING, UNSPECIFIED: ICD-10-CM

## 2020-10-06 DIAGNOSIS — Z87.442 PERSONAL HISTORY OF URINARY CALCULI: ICD-10-CM

## 2020-10-06 DIAGNOSIS — Z88.0 ALLERGY STATUS TO PENICILLIN: ICD-10-CM

## 2020-10-06 DIAGNOSIS — Z86.19 PERSONAL HISTORY OF OTHER INFECTIOUS AND PARASITIC DISEASES: ICD-10-CM

## 2020-10-06 DIAGNOSIS — R11.0 NAUSEA: ICD-10-CM

## 2020-10-06 LAB
ALBUMIN SERPL ELPH-MCNC: 4.2 G/DL — SIGNIFICANT CHANGE UP (ref 3.3–5)
ALP SERPL-CCNC: 56 U/L — SIGNIFICANT CHANGE UP (ref 40–120)
ALT FLD-CCNC: 29 U/L — SIGNIFICANT CHANGE UP (ref 12–78)
ANION GAP SERPL CALC-SCNC: 4 MMOL/L — LOW (ref 5–17)
APPEARANCE UR: CLEAR — SIGNIFICANT CHANGE UP
AST SERPL-CCNC: 19 U/L — SIGNIFICANT CHANGE UP (ref 15–37)
BASOPHILS # BLD AUTO: 0.06 K/UL — SIGNIFICANT CHANGE UP (ref 0–0.2)
BASOPHILS NFR BLD AUTO: 1 % — SIGNIFICANT CHANGE UP (ref 0–2)
BILIRUB SERPL-MCNC: 0.6 MG/DL — SIGNIFICANT CHANGE UP (ref 0.2–1.2)
BILIRUB UR-MCNC: NEGATIVE — SIGNIFICANT CHANGE UP
BUN SERPL-MCNC: 16 MG/DL — SIGNIFICANT CHANGE UP (ref 7–23)
CALCIUM SERPL-MCNC: 9 MG/DL — SIGNIFICANT CHANGE UP (ref 8.5–10.1)
CHLORIDE SERPL-SCNC: 109 MMOL/L — HIGH (ref 96–108)
CO2 SERPL-SCNC: 25 MMOL/L — SIGNIFICANT CHANGE UP (ref 22–31)
COLOR SPEC: YELLOW — SIGNIFICANT CHANGE UP
CREAT SERPL-MCNC: 1.3 MG/DL — SIGNIFICANT CHANGE UP (ref 0.5–1.3)
DIFF PNL FLD: ABNORMAL
EOSINOPHIL # BLD AUTO: 0.07 K/UL — SIGNIFICANT CHANGE UP (ref 0–0.5)
EOSINOPHIL NFR BLD AUTO: 1.2 % — SIGNIFICANT CHANGE UP (ref 0–6)
GLUCOSE SERPL-MCNC: 102 MG/DL — HIGH (ref 70–99)
GLUCOSE UR QL: NEGATIVE MG/DL — SIGNIFICANT CHANGE UP
HCT VFR BLD CALC: 47 % — SIGNIFICANT CHANGE UP (ref 39–50)
HGB BLD-MCNC: 15.8 G/DL — SIGNIFICANT CHANGE UP (ref 13–17)
IMM GRANULOCYTES NFR BLD AUTO: 0.5 % — SIGNIFICANT CHANGE UP (ref 0–1.5)
KETONES UR-MCNC: NEGATIVE — SIGNIFICANT CHANGE UP
LACTATE SERPL-SCNC: 0.5 MMOL/L — LOW (ref 0.7–2)
LEUKOCYTE ESTERASE UR-ACNC: NEGATIVE — SIGNIFICANT CHANGE UP
LIDOCAIN IGE QN: 113 U/L — SIGNIFICANT CHANGE UP (ref 73–393)
LYMPHOCYTES # BLD AUTO: 1.54 K/UL — SIGNIFICANT CHANGE UP (ref 1–3.3)
LYMPHOCYTES # BLD AUTO: 25.4 % — SIGNIFICANT CHANGE UP (ref 13–44)
MAGNESIUM SERPL-MCNC: 2.1 MG/DL — SIGNIFICANT CHANGE UP (ref 1.6–2.6)
MCHC RBC-ENTMCNC: 29.5 PG — SIGNIFICANT CHANGE UP (ref 27–34)
MCHC RBC-ENTMCNC: 33.6 GM/DL — SIGNIFICANT CHANGE UP (ref 32–36)
MCV RBC AUTO: 87.7 FL — SIGNIFICANT CHANGE UP (ref 80–100)
MONOCYTES # BLD AUTO: 0.54 K/UL — SIGNIFICANT CHANGE UP (ref 0–0.9)
MONOCYTES NFR BLD AUTO: 8.9 % — SIGNIFICANT CHANGE UP (ref 2–14)
NEUTROPHILS # BLD AUTO: 3.83 K/UL — SIGNIFICANT CHANGE UP (ref 1.8–7.4)
NEUTROPHILS NFR BLD AUTO: 63 % — SIGNIFICANT CHANGE UP (ref 43–77)
NITRITE UR-MCNC: NEGATIVE — SIGNIFICANT CHANGE UP
PH UR: 5 — SIGNIFICANT CHANGE UP (ref 5–8)
PHOSPHATE SERPL-MCNC: 2.7 MG/DL — SIGNIFICANT CHANGE UP (ref 2.5–4.5)
PLATELET # BLD AUTO: 228 K/UL — SIGNIFICANT CHANGE UP (ref 150–400)
POTASSIUM SERPL-MCNC: 4.1 MMOL/L — SIGNIFICANT CHANGE UP (ref 3.5–5.3)
POTASSIUM SERPL-SCNC: 4.1 MMOL/L — SIGNIFICANT CHANGE UP (ref 3.5–5.3)
PROT SERPL-MCNC: 7.6 GM/DL — SIGNIFICANT CHANGE UP (ref 6–8.3)
PROT UR-MCNC: NEGATIVE MG/DL — SIGNIFICANT CHANGE UP
RBC # BLD: 5.36 M/UL — SIGNIFICANT CHANGE UP (ref 4.2–5.8)
RBC # FLD: 12.8 % — SIGNIFICANT CHANGE UP (ref 10.3–14.5)
SODIUM SERPL-SCNC: 138 MMOL/L — SIGNIFICANT CHANGE UP (ref 135–145)
SP GR SPEC: 1.02 — SIGNIFICANT CHANGE UP (ref 1.01–1.02)
UROBILINOGEN FLD QL: NEGATIVE MG/DL — SIGNIFICANT CHANGE UP
WBC # BLD: 6.07 K/UL — SIGNIFICANT CHANGE UP (ref 3.8–10.5)
WBC # FLD AUTO: 6.07 K/UL — SIGNIFICANT CHANGE UP (ref 3.8–10.5)

## 2020-10-06 PROCEDURE — 74177 CT ABD & PELVIS W/CONTRAST: CPT

## 2020-10-06 PROCEDURE — 87086 URINE CULTURE/COLONY COUNT: CPT

## 2020-10-06 PROCEDURE — 99284 EMERGENCY DEPT VISIT MOD MDM: CPT

## 2020-10-06 PROCEDURE — 83690 ASSAY OF LIPASE: CPT

## 2020-10-06 PROCEDURE — 85025 COMPLETE CBC W/AUTO DIFF WBC: CPT

## 2020-10-06 PROCEDURE — 87040 BLOOD CULTURE FOR BACTERIA: CPT

## 2020-10-06 PROCEDURE — 80053 COMPREHEN METABOLIC PANEL: CPT

## 2020-10-06 PROCEDURE — 99284 EMERGENCY DEPT VISIT MOD MDM: CPT | Mod: 25

## 2020-10-06 PROCEDURE — 81001 URINALYSIS AUTO W/SCOPE: CPT

## 2020-10-06 PROCEDURE — 74177 CT ABD & PELVIS W/CONTRAST: CPT | Mod: 26

## 2020-10-06 PROCEDURE — 83605 ASSAY OF LACTIC ACID: CPT

## 2020-10-06 PROCEDURE — 36415 COLL VENOUS BLD VENIPUNCTURE: CPT

## 2020-10-06 PROCEDURE — 84100 ASSAY OF PHOSPHORUS: CPT

## 2020-10-06 PROCEDURE — 83735 ASSAY OF MAGNESIUM: CPT

## 2020-10-06 RX ORDER — SODIUM CHLORIDE 9 MG/ML
1000 INJECTION INTRAMUSCULAR; INTRAVENOUS; SUBCUTANEOUS ONCE
Refills: 0 | Status: COMPLETED | OUTPATIENT
Start: 2020-10-06 | End: 2020-10-06

## 2020-10-06 RX ADMIN — SODIUM CHLORIDE 2000 MILLILITER(S): 9 INJECTION INTRAMUSCULAR; INTRAVENOUS; SUBCUTANEOUS at 11:23

## 2020-10-06 NOTE — ED PROVIDER NOTE - PROGRESS NOTE DETAILS
Kenya MARTINEZ for ED attending, Dr. Rosas: All results of labs, CT reviewed with patient, does not want to wait for stool sample results, will f/u with Doctor outpatient. Alison ARIAS: notified of all results; strict return precautions given.

## 2020-10-06 NOTE — ED ADULT NURSE NOTE - OBJECTIVE STATEMENT
Patient presents to the ED c/o LLQ abdominal pain and diarrhea x1 week. Pt reports LLQ abdominal pain that worsens with laying down and bending over. Pt reports going to his PCP last week and started on Cipro and flagyl. Also started on nystatin for suspected thrush on tongue. Notes current nausea. Notes Hx of diverticulitis, C. diff.

## 2020-10-06 NOTE — ED PROVIDER NOTE - PATIENT PORTAL LINK FT
You can access the FollowMyHealth Patient Portal offered by [facility] by registering at the following website: [email or web address]. By joining [employer]’s FollowMyHealth portal, you will also be able to view your health information using other applications (apps) compatible with our system.

## 2020-10-06 NOTE — ED STATDOCS - PROGRESS NOTE DETAILS
Kenya Noble for attending Dr. Saab: 59 y/o male with a PMHx of spinal stenosis, B-cell lymphoma, diverticulitis, IBS, C. diff, Lyme disease, presents to the ED c/o LLQ abdominal pain and diarrhea x1 week. Pt reports LLQ abdominal pain that worsens with laying down and bending over. Pt reports going to his PCP last week and started on Cipro and flagyl. Also started on nystatin for suspected thrush on tongue. Notes current nausea. Notes Hx of diverticulitis, C. diff. No other complaints at this time. Allergies: Penicillin. Intolerances: Dilaudid. Will send pt to main ED for further evaluation.

## 2020-10-06 NOTE — ED STATDOCS - PMH
Back pain    Childhood asthma    H/O babesiosis  2006  H/O Clostridium difficile infection  1998  H/O measles  2006  H/O [place] spotted fever  2015  Herniated nucleus pulposus, lumbar  sacral  IBS (irritable bowel syndrome)    Kidney stone    Lyme disease  1998  Lymphadenopathy  retroperitoneal  Microscopic hematuria    Prediabetes    Spinal stenosis

## 2020-10-06 NOTE — ED PROVIDER NOTE - PMH
Back pain    Childhood asthma    H/O babesiosis  2006  H/O Clostridium difficile infection  1998  H/O measles  2006  H/O [place] spotted fever  2015  Herniated nucleus pulposus, lumbar  sacral  IBS (irritable bowel syndrome)    Kidney stone    Lyme disease  1998  Lymphadenopathy  retroperitoneal  Microscopic hematuria    Prediabetes    Spinal stenosis

## 2020-10-06 NOTE — ED STATDOCS - NS_ ATTENDINGSCRIBEDETAILS _ED_A_ED_FT
I, Kingston Saab MD, performed the initial face to face bedside interview with this patient regarding history of present illness and determined that the patient should be seen in the main ED.  The history, was documented by the scribe in my presence and I attest to the accuracy of the documentation.

## 2020-10-06 NOTE — ED PROVIDER NOTE - OBJECTIVE STATEMENT
59 y/o M with a PMHx of spinal stenosis, B-cell lymphoma, diverticulitis, IBS, C. diff, Lyme disease presenting to the ED c/o LLQ abdominal pain with associated nausea, diarrhea x1 week. Pain worsened with lying down and bending over. Patient reports he was seen by PCD,  started on Cipro and flagyl for possible diverticulitis. Patient reports he has had worsening of symptoms, came to the ED for further evaluation. Notes that he was also started on Nystatin for suspected thrush on tongue. Denies any vomiting, fever or chills. Allergic to Penicillin, intolerant to Dilaudid

## 2020-10-06 NOTE — ED ADULT TRIAGE NOTE - CHIEF COMPLAINT QUOTE
pt presents to ED due to complaints of severe abdominal pain with nausea and diarrhea with significant weight loss pt has hx of diverticulitis as well as cancer not getting currently any chemo at this time

## 2020-10-07 LAB
CULTURE RESULTS: NO GROWTH — SIGNIFICANT CHANGE UP
SPECIMEN SOURCE: SIGNIFICANT CHANGE UP

## 2020-10-09 ENCOUNTER — TRANSCRIPTION ENCOUNTER (OUTPATIENT)
Age: 60
End: 2020-10-09

## 2020-10-11 LAB
CULTURE RESULTS: SIGNIFICANT CHANGE UP
CULTURE RESULTS: SIGNIFICANT CHANGE UP
SPECIMEN SOURCE: SIGNIFICANT CHANGE UP
SPECIMEN SOURCE: SIGNIFICANT CHANGE UP

## 2020-10-20 ENCOUNTER — TRANSCRIPTION ENCOUNTER (OUTPATIENT)
Age: 60
End: 2020-10-20

## 2020-10-28 ENCOUNTER — TRANSCRIPTION ENCOUNTER (OUTPATIENT)
Age: 60
End: 2020-10-28

## 2020-10-28 DIAGNOSIS — R06.02 SHORTNESS OF BREATH: ICD-10-CM

## 2020-10-28 DIAGNOSIS — M62.838 OTHER MUSCLE SPASM: ICD-10-CM

## 2020-11-06 ENCOUNTER — TRANSCRIPTION ENCOUNTER (OUTPATIENT)
Age: 60
End: 2020-11-06

## 2020-11-17 ENCOUNTER — LABORATORY RESULT (OUTPATIENT)
Age: 60
End: 2020-11-17

## 2021-03-23 ENCOUNTER — APPOINTMENT (OUTPATIENT)
Dept: NUCLEAR MEDICINE | Facility: CLINIC | Age: 61
End: 2021-03-23
Payer: COMMERCIAL

## 2021-03-23 ENCOUNTER — RESULT REVIEW (OUTPATIENT)
Age: 61
End: 2021-03-23

## 2021-03-23 ENCOUNTER — OUTPATIENT (OUTPATIENT)
Dept: OUTPATIENT SERVICES | Facility: HOSPITAL | Age: 61
LOS: 1 days | End: 2021-03-23

## 2021-03-23 DIAGNOSIS — Z98.890 OTHER SPECIFIED POSTPROCEDURAL STATES: Chronic | ICD-10-CM

## 2021-03-23 DIAGNOSIS — C82.80 OTHER TYPES OF FOLLICULAR LYMPHOMA, UNSPECIFIED SITE: ICD-10-CM

## 2021-03-23 DIAGNOSIS — Z90.89 ACQUIRED ABSENCE OF OTHER ORGANS: Chronic | ICD-10-CM

## 2021-03-23 PROCEDURE — 78815 PET IMAGE W/CT SKULL-THIGH: CPT | Mod: 26,PS

## 2021-04-15 ENCOUNTER — APPOINTMENT (OUTPATIENT)
Dept: FAMILY MEDICINE | Facility: CLINIC | Age: 61
End: 2021-04-15

## 2022-01-05 ENCOUNTER — APPOINTMENT (OUTPATIENT)
Dept: FAMILY MEDICINE | Facility: CLINIC | Age: 62
End: 2022-01-05
Payer: COMMERCIAL

## 2022-01-05 ENCOUNTER — RX CHANGE (OUTPATIENT)
Age: 62
End: 2022-01-05

## 2022-01-05 DIAGNOSIS — K57.92 DIVERTICULITIS OF INTESTINE, PART UNSPECIFIED, W/OUT PERFORATION OR ABSCESS W/OUT BLEEDING: ICD-10-CM

## 2022-01-05 PROCEDURE — 99443: CPT

## 2022-01-05 RX ORDER — CIPROFLOXACIN HYDROCHLORIDE 500 MG/1
500 TABLET, FILM COATED ORAL
Qty: 20 | Refills: 0 | Status: DISCONTINUED | COMMUNITY
Start: 2020-10-01 | End: 2022-01-05

## 2022-01-06 ENCOUNTER — APPOINTMENT (OUTPATIENT)
Dept: FAMILY MEDICINE | Facility: CLINIC | Age: 62
End: 2022-01-06
Payer: COMMERCIAL

## 2022-01-06 DIAGNOSIS — U07.1 COVID-19: ICD-10-CM

## 2022-01-06 PROCEDURE — 99213 OFFICE O/P EST LOW 20 MIN: CPT | Mod: 95

## 2022-01-06 RX ORDER — LEVOFLOXACIN 500 MG/1
500 TABLET, FILM COATED ORAL DAILY
Qty: 10 | Refills: 0 | Status: DISCONTINUED | COMMUNITY
Start: 2022-01-05 | End: 2022-01-06

## 2022-05-09 NOTE — BRIEF OPERATIVE NOTE - NSICDXBRIEFPROCEDURE_GEN_ALL_CORE_FT
PROCEDURES:  Lymphadenectomy, retroperitoneum 24-May-2019 10:39:44  Kandi Mccormick E Render Risk Assessment In Note?: no Detail Level: Simple Additional Notes: Defer all 4 sites.  \\nFirst defer biopsy picture on left lower leg is being watched

## 2022-07-26 ENCOUNTER — LABORATORY RESULT (OUTPATIENT)
Age: 62
End: 2022-07-26

## 2022-07-26 ENCOUNTER — APPOINTMENT (OUTPATIENT)
Dept: FAMILY MEDICINE | Facility: CLINIC | Age: 62
End: 2022-07-26

## 2022-07-26 VITALS
OXYGEN SATURATION: 98 % | WEIGHT: 181 LBS | DIASTOLIC BLOOD PRESSURE: 97 MMHG | SYSTOLIC BLOOD PRESSURE: 153 MMHG | BODY MASS INDEX: 25.91 KG/M2 | TEMPERATURE: 98 F | RESPIRATION RATE: 16 BRPM | HEART RATE: 80 BPM | HEIGHT: 70 IN

## 2022-07-26 DIAGNOSIS — C85.90 NON-HODGKIN LYMPHOMA, UNSPECIFIED, UNSPECIFIED SITE: ICD-10-CM

## 2022-07-26 DIAGNOSIS — R59.1 GENERALIZED ENLARGED LYMPH NODES: ICD-10-CM

## 2022-07-26 PROCEDURE — G0444 DEPRESSION SCREEN ANNUAL: CPT | Mod: 59

## 2022-07-26 PROCEDURE — 96160 PT-FOCUSED HLTH RISK ASSMT: CPT | Mod: 59

## 2022-07-26 PROCEDURE — 36415 COLL VENOUS BLD VENIPUNCTURE: CPT

## 2022-07-26 PROCEDURE — 99214 OFFICE O/P EST MOD 30 MIN: CPT | Mod: 25

## 2022-07-28 PROBLEM — R59.1 LYMPHADENOPATHY: Status: ACTIVE | Noted: 2022-01-06

## 2022-08-01 LAB
25(OH)D3 SERPL-MCNC: 82.6 NG/ML
ALBUMIN SERPL ELPH-MCNC: 4.8 G/DL
ALP BLD-CCNC: 68 U/L
ALT SERPL-CCNC: 50 U/L
ANA SER IF-ACNC: NEGATIVE
ANION GAP SERPL CALC-SCNC: 13 MMOL/L
APPEARANCE: CLEAR
AST SERPL-CCNC: 31 U/L
B BURGDOR AB SER-IMP: NEGATIVE
B BURGDOR IGG+IGM SER QL: 0.63 INDEX
BABESIA ANTIBODIES, IGG: NORMAL
BABESIA ANTIBODIES, IGM: NORMAL
BASOPHILS # BLD AUTO: 0.09 K/UL
BASOPHILS NFR BLD AUTO: 1.4 %
BILIRUB SERPL-MCNC: 0.5 MG/DL
BILIRUBIN URINE: NEGATIVE
BLOOD URINE: NEGATIVE
BUN SERPL-MCNC: 17 MG/DL
CALCIUM SERPL-MCNC: 9.6 MG/DL
CHLORIDE SERPL-SCNC: 102 MMOL/L
CHOLEST SERPL-MCNC: 255 MG/DL
CO2 SERPL-SCNC: 22 MMOL/L
COLOR: YELLOW
CREAT SERPL-MCNC: 1.12 MG/DL
CREAT SPEC-SCNC: 145 MG/DL
CRP SERPL-MCNC: <3 MG/L
EBV EA AB SER IA-ACNC: 10 U/ML
EBV EA AB TITR SER IF: NEGATIVE
EBV EA IGG SER QL IA: <3 U/ML
EBV EA IGG SER-ACNC: ABNORMAL
EBV EA IGM SER IA-ACNC: NEGATIVE
EBV PATRN SPEC IB-IMP: NORMAL
EBV VCA IGG SER IA-ACNC: >750 U/ML
EBV VCA IGM SER QL IA: <10 U/ML
EGFR: 75 ML/MIN/1.73M2
EOSINOPHIL # BLD AUTO: 0.21 K/UL
EOSINOPHIL NFR BLD AUTO: 3.2 %
EPSTEIN-BARR VIRUS CAPSID ANTIGEN IGG: POSITIVE
ERYTHROCYTE [SEDIMENTATION RATE] IN BLOOD BY WESTERGREN METHOD: 6 MM/HR
ESTIMATED AVERAGE GLUCOSE: 120 MG/DL
F. TULARENSIS AB, IGG: NEGATIVE
F. TULARENSIS AB, IGM: NEGATIVE
F. TULARENSIS INTERPRETATION: NORMAL
FERRITIN SERPL-MCNC: 160 NG/ML
FOLATE SERPL-MCNC: 19.3 NG/ML
GLUCOSE QUALITATIVE U: NEGATIVE
GLUCOSE SERPL-MCNC: 120 MG/DL
HBA1C MFR BLD HPLC: 5.8 %
HCT VFR BLD CALC: 45.1 %
HDLC SERPL-MCNC: 51 MG/DL
HGB BLD-MCNC: 15.1 G/DL
IMM GRANULOCYTES NFR BLD AUTO: 0.8 %
KETONES URINE: NEGATIVE
LDH SERPL-CCNC: 166 U/L
LDLC SERPL CALC-MCNC: 161 MG/DL
LEUKOCYTE ESTERASE URINE: NEGATIVE
LYMPHOCYTES # BLD AUTO: 2.31 K/UL
LYMPHOCYTES NFR BLD AUTO: 35.4 %
MAN DIFF?: NORMAL
MCHC RBC-ENTMCNC: 29.2 PG
MCHC RBC-ENTMCNC: 33.5 GM/DL
MCV RBC AUTO: 87.1 FL
MICROALBUMIN 24H UR DL<=1MG/L-MCNC: <1.2 MG/DL
MICROALBUMIN/CREAT 24H UR-RTO: NORMAL MG/G
MONOCYTES # BLD AUTO: 0.62 K/UL
MONOCYTES NFR BLD AUTO: 9.5 %
NEUTROPHILS # BLD AUTO: 3.24 K/UL
NEUTROPHILS NFR BLD AUTO: 49.7 %
NITRITE URINE: NEGATIVE
NONHDLC SERPL-MCNC: 204 MG/DL
PH URINE: 6
PLATELET # BLD AUTO: 261 K/UL
POTASSIUM SERPL-SCNC: 4.3 MMOL/L
PROT SERPL-MCNC: 7.1 G/DL
PROTEIN URINE: NORMAL
PSA FREE FLD-MCNC: 25 %
PSA FREE SERPL-MCNC: 0.55 NG/ML
PSA SERPL-MCNC: 2.15 NG/ML
R RICKETTSI IGG CSF-ACNC: NEGATIVE
R RICKETTSI IGM CSF-ACNC: 0.32 INDEX
RBC # BLD: 5.18 M/UL
RBC # FLD: 13.2 %
RHEUMATOID FACT SER QL: <10 IU/ML
SODIUM SERPL-SCNC: 137 MMOL/L
SPECIFIC GRAVITY URINE: 1.02
T3RU NFR SERPL: 0.9 TBI
T4 FREE SERPL-MCNC: 1.3 NG/DL
TESTOST SERPL-MCNC: 339 NG/DL
TRIGL SERPL-MCNC: 212 MG/DL
TSH SERPL-ACNC: 2.21 UIU/ML
UROBILINOGEN URINE: NORMAL
VIT B12 SERPL-MCNC: 645 PG/ML
WBC # FLD AUTO: 6.52 K/UL

## 2022-08-03 ENCOUNTER — TRANSCRIPTION ENCOUNTER (OUTPATIENT)
Age: 62
End: 2022-08-03

## 2022-08-05 LAB — DHEA-SULFATE, SERUM: 116 UG/DL

## 2022-08-11 ENCOUNTER — OUTPATIENT (OUTPATIENT)
Dept: OUTPATIENT SERVICES | Facility: HOSPITAL | Age: 62
LOS: 1 days | End: 2022-08-11

## 2022-08-11 ENCOUNTER — APPOINTMENT (OUTPATIENT)
Dept: NUCLEAR MEDICINE | Facility: CLINIC | Age: 62
End: 2022-08-11

## 2022-08-11 DIAGNOSIS — Z98.890 OTHER SPECIFIED POSTPROCEDURAL STATES: Chronic | ICD-10-CM

## 2022-08-11 DIAGNOSIS — C85.90 NON-HODGKIN LYMPHOMA, UNSPECIFIED, UNSPECIFIED SITE: ICD-10-CM

## 2022-08-11 DIAGNOSIS — Z90.89 ACQUIRED ABSENCE OF OTHER ORGANS: Chronic | ICD-10-CM

## 2022-08-11 PROCEDURE — 78815 PET IMAGE W/CT SKULL-THIGH: CPT | Mod: 26,PS

## 2022-08-12 LAB
ANAPLASMA PHAGOCYTOPHILIA IGG ANTIBODIES: NEGATIVE
ANAPLASMA PHAGOCYTOPHILIA IGM ANTIBODIES: NEGATIVE
EHRLICIA CHAFFEENIS IGG ANTIBODIES: NEGATIVE
EHRLICIA CHAFFEENIS IGM ANTIBODIES: NEGATIVE

## 2022-08-19 RX ORDER — FLASH GLUCOSE SENSOR
KIT MISCELLANEOUS
Qty: 6 | Refills: 5 | Status: ACTIVE | COMMUNITY
Start: 2022-08-19 | End: 1900-01-01

## 2022-10-03 ENCOUNTER — RX RENEWAL (OUTPATIENT)
Age: 62
End: 2022-10-03

## 2022-10-03 RX ORDER — FLASH GLUCOSE SCANNING READER
EACH MISCELLANEOUS
Qty: 1 | Refills: 0 | Status: ACTIVE | COMMUNITY
Start: 2022-08-19 | End: 1900-01-01

## 2023-01-18 ENCOUNTER — APPOINTMENT (OUTPATIENT)
Dept: FAMILY MEDICINE | Facility: CLINIC | Age: 63
End: 2023-01-18
Payer: COMMERCIAL

## 2023-01-18 VITALS
HEIGHT: 70 IN | RESPIRATION RATE: 16 BRPM | WEIGHT: 169 LBS | SYSTOLIC BLOOD PRESSURE: 155 MMHG | BODY MASS INDEX: 24.2 KG/M2 | TEMPERATURE: 98 F | DIASTOLIC BLOOD PRESSURE: 90 MMHG | HEART RATE: 71 BPM | OXYGEN SATURATION: 100 %

## 2023-01-18 DIAGNOSIS — R10.9 UNSPECIFIED ABDOMINAL PAIN: ICD-10-CM

## 2023-01-18 DIAGNOSIS — E11.9 TYPE 2 DIABETES MELLITUS W/OUT COMPLICATIONS: ICD-10-CM

## 2023-01-18 PROCEDURE — 99214 OFFICE O/P EST MOD 30 MIN: CPT

## 2023-01-18 RX ORDER — METRONIDAZOLE 500 MG/1
500 TABLET ORAL 3 TIMES DAILY
Qty: 30 | Refills: 0 | Status: DISCONTINUED | COMMUNITY
Start: 2020-10-01 | End: 2023-01-18

## 2023-01-18 RX ORDER — DOXYCYCLINE HYCLATE 100 MG/1
100 CAPSULE ORAL TWICE DAILY
Qty: 14 | Refills: 0 | Status: DISCONTINUED | COMMUNITY
Start: 2022-01-06 | End: 2023-01-18

## 2023-01-18 RX ORDER — METHYLPREDNISOLONE 4 MG/1
4 TABLET ORAL
Qty: 1 | Refills: 0 | Status: DISCONTINUED | COMMUNITY
Start: 2022-01-06 | End: 2023-01-18

## 2023-01-18 RX ORDER — NYSTATIN 100000 [USP'U]/ML
100000 SUSPENSION ORAL 4 TIMES DAILY
Qty: 200 | Refills: 1 | Status: DISCONTINUED | COMMUNITY
Start: 2020-10-01 | End: 2023-01-18

## 2023-01-18 RX ORDER — AZITHROMYCIN 250 MG/1
250 TABLET, FILM COATED ORAL
Qty: 1 | Refills: 0 | Status: DISCONTINUED | COMMUNITY
Start: 2022-01-06 | End: 2023-01-18

## 2023-01-22 PROBLEM — E11.9 TYPE 2 DIABETES MELLITUS: Status: ACTIVE | Noted: 2022-08-19

## 2023-01-25 ENCOUNTER — RESULT REVIEW (OUTPATIENT)
Age: 63
End: 2023-01-25

## 2023-01-31 ENCOUNTER — APPOINTMENT (OUTPATIENT)
Dept: CT IMAGING | Facility: CLINIC | Age: 63
End: 2023-01-31
Payer: COMMERCIAL

## 2023-01-31 ENCOUNTER — OUTPATIENT (OUTPATIENT)
Dept: OUTPATIENT SERVICES | Facility: HOSPITAL | Age: 63
LOS: 1 days | End: 2023-01-31
Payer: COMMERCIAL

## 2023-01-31 DIAGNOSIS — Z90.89 ACQUIRED ABSENCE OF OTHER ORGANS: Chronic | ICD-10-CM

## 2023-01-31 DIAGNOSIS — R10.9 UNSPECIFIED ABDOMINAL PAIN: ICD-10-CM

## 2023-01-31 DIAGNOSIS — Z98.890 OTHER SPECIFIED POSTPROCEDURAL STATES: Chronic | ICD-10-CM

## 2023-01-31 PROCEDURE — 74177 CT ABD & PELVIS W/CONTRAST: CPT | Mod: 26

## 2023-01-31 PROCEDURE — 74177 CT ABD & PELVIS W/CONTRAST: CPT

## 2023-07-18 ENCOUNTER — LABORATORY RESULT (OUTPATIENT)
Age: 63
End: 2023-07-18

## 2023-07-18 DIAGNOSIS — Z00.00 ENCOUNTER FOR GENERAL ADULT MEDICAL EXAMINATION W/OUT ABNORMAL FINDINGS: ICD-10-CM

## 2023-07-23 LAB
25(OH)D3 SERPL-MCNC: 71.6 NG/ML
ALBUMIN SERPL ELPH-MCNC: 4.6 G/DL
ALP BLD-CCNC: 65 U/L
ALT SERPL-CCNC: 24 U/L
AMYLASE/CREAT SERPL: 61 U/L
ANA SER IF-ACNC: NEGATIVE
ANION GAP SERPL CALC-SCNC: 15 MMOL/L
APPEARANCE: CLEAR
AST SERPL-CCNC: 23 U/L
B BURGDOR AB SER-IMP: ABNORMAL
B BURGDOR IGG+IGM SER QL: 0.93 INDEX
BILIRUB SERPL-MCNC: 0.5 MG/DL
BILIRUBIN URINE: NEGATIVE
BLOOD URINE: NEGATIVE
BUN SERPL-MCNC: 15 MG/DL
CALCIUM SERPL-MCNC: 9.3 MG/DL
CHLORIDE SERPL-SCNC: 100 MMOL/L
CHOLEST SERPL-MCNC: 236 MG/DL
CLAM IGE QN: <0.1 KUA/L
CO2 SERPL-SCNC: 25 MMOL/L
CODFISH IGE QN: <0.1 KUA/L
COLOR: YELLOW
CORN IGE QN: <0.1 KUA/L
COVID-19 NUCLEOCAPSID  GAM ANTIBODY INTERPRETATION: POSITIVE
COVID-19 SPIKE DOMAIN ANTIBODY INTERPRETATION: POSITIVE
COW MILK IGE QN: <0.1 KUA/L
CREAT SERPL-MCNC: 1.2 MG/DL
CREAT SPEC-SCNC: 181 MG/DL
DEPRECATED CLAM IGE RAST QL: 0
DEPRECATED CODFISH IGE RAST QL: 0
DEPRECATED CORN IGE RAST QL: 0
DEPRECATED COW MILK IGE RAST QL: 0
DEPRECATED EGG WHITE IGE RAST QL: 0
DEPRECATED GLUTEN IGE RAST QL: <0.1 KUA/L
DEPRECATED PEANUT IGE RAST QL: 0
DEPRECATED SCALLOP IGE RAST QL: <0.1 KUA/L
DEPRECATED SESAME SEED IGE RAST QL: 0
DEPRECATED SHRIMP IGE RAST QL: 0
DEPRECATED SOYBEAN IGE RAST QL: 0
DEPRECATED WALNUT IGE RAST QL: 0
DEPRECATED WHEAT IGE RAST QL: 0
EBV EA AB SER IA-ACNC: 9.1 U/ML
EBV EA AB TITR SER IF: NEGATIVE
EBV EA IGG SER QL IA: <3 U/ML
EBV EA IGG SER-ACNC: ABNORMAL
EBV EA IGM SER IA-ACNC: NEGATIVE
EBV PATRN SPEC IB-IMP: NORMAL
EBV VCA IGG SER IA-ACNC: 578 U/ML
EBV VCA IGM SER QL IA: <10 U/ML
EGFR: 69 ML/MIN/1.73M2
EGG WHITE IGE QN: <0.1 KUA/L
EPSTEIN-BARR VIRUS CAPSID ANTIGEN IGG: POSITIVE
ERYTHROCYTE [SEDIMENTATION RATE] IN BLOOD BY WESTERGREN METHOD: 5 MM/HR
ESTIMATED AVERAGE GLUCOSE: 123 MG/DL
F. TULARENSIS AB, IGG: NEGATIVE
F. TULARENSIS AB, IGM: NEGATIVE
F. TULARENSIS INTERPRETATION: NORMAL
FERRITIN SERPL-MCNC: 145 NG/ML
FOLATE SERPL-MCNC: 13.7 NG/ML
GLUCOSE QUALITATIVE U: NEGATIVE MG/DL
GLUCOSE SERPL-MCNC: 107 MG/DL
GLUTEN IGG QN: 0
HBA1C MFR BLD HPLC: 5.9 %
HDLC SERPL-MCNC: 58 MG/DL
IRON SATN MFR SERPL: 24 %
IRON SERPL-MCNC: 73 UG/DL
KETONES URINE: NEGATIVE MG/DL
LDLC SERPL CALC-MCNC: 163 MG/DL
LEUKOCYTE ESTERASE URINE: NEGATIVE
LPL SERPL-CCNC: 27 U/L
MICROALBUMIN 24H UR DL<=1MG/L-MCNC: <1.2 MG/DL
MICROALBUMIN/CREAT 24H UR-RTO: NORMAL MG/G
NITRITE URINE: NEGATIVE
NONHDLC SERPL-MCNC: 178 MG/DL
PEANUT IGE QN: <0.1 KUA/L
PH URINE: 7
POTASSIUM SERPL-SCNC: 4.2 MMOL/L
PROT SERPL-MCNC: 6.7 G/DL
PROTEIN URINE: NEGATIVE MG/DL
PSA FREE FLD-MCNC: 29 %
PSA FREE SERPL-MCNC: 0.7 NG/ML
PSA SERPL-MCNC: 2.43 NG/ML
R RICKETTSI IGG CSF-ACNC: NEGATIVE
R RICKETTSI IGM CSF-ACNC: 0.3 INDEX
RHEUMATOID FACT SER QL: <10 IU/ML
SARS-COV-2 AB SERPL IA-ACNC: >250 U/ML
SARS-COV-2 AB SERPL QL IA: 200 INDEX
SCALLOP IGE QN: 0
SCALLOP IGE QN: <0.1 KUA/L
SESAME SEED IGE QN: <0.1 KUA/L
SODIUM SERPL-SCNC: 140 MMOL/L
SOYBEAN IGE QN: <0.1 KUA/L
SPECIFIC GRAVITY URINE: 1.02
T3RU NFR SERPL: 0.9 TBI
T4 FREE SERPL-MCNC: 1.1 NG/DL
TIBC SERPL-MCNC: 307 UG/DL
TOTAL IGE SMQN RAST: 83 KU/L
TRIGL SERPL-MCNC: 83 MG/DL
TSH SERPL-ACNC: 3.35 UIU/ML
TTG IGA SER IA-ACNC: <1.2 U/ML
TTG IGA SER-ACNC: NEGATIVE
TTG IGG SER IA-ACNC: 8.1 U/ML
TTG IGG SER IA-ACNC: ABNORMAL
UIBC SERPL-MCNC: 233 UG/DL
UROBILINOGEN URINE: 0.2 MG/DL
VIT B12 SERPL-MCNC: 548 PG/ML
WALNUT IGE QN: <0.1 KUA/L
WHEAT IGE QN: <0.1 KUA/L

## 2023-07-24 LAB
ANAPLASMA PHAGOCYTOPHILIA IGG ANTIBODIES: NEGATIVE
ANAPLASMA PHAGOCYTOPHILIA IGM ANTIBODIES: NEGATIVE
BABESIA ANTIBODIES, IGG: NORMAL
BABESIA ANTIBODIES, IGM: NORMAL
EHRLICIA CHAFFEENIS IGG ANTIBODIES: NEGATIVE
EHRLICIA CHAFFEENIS IGM ANTIBODIES: NEGATIVE

## 2023-11-16 ENCOUNTER — APPOINTMENT (OUTPATIENT)
Dept: FAMILY MEDICINE | Facility: CLINIC | Age: 63
End: 2023-11-16
Payer: COMMERCIAL

## 2023-11-16 DIAGNOSIS — M54.9 DORSALGIA, UNSPECIFIED: ICD-10-CM

## 2023-11-16 PROCEDURE — 99443: CPT

## 2023-11-17 PROBLEM — M54.9 BACK PAIN, ACUTE: Status: ACTIVE | Noted: 2019-04-04

## 2023-11-21 ENCOUNTER — APPOINTMENT (OUTPATIENT)
Dept: NUCLEAR MEDICINE | Facility: CLINIC | Age: 63
End: 2023-11-21
Payer: COMMERCIAL

## 2023-11-21 ENCOUNTER — OUTPATIENT (OUTPATIENT)
Dept: OUTPATIENT SERVICES | Facility: HOSPITAL | Age: 63
LOS: 1 days | End: 2023-11-21

## 2023-11-21 DIAGNOSIS — Z98.890 OTHER SPECIFIED POSTPROCEDURAL STATES: Chronic | ICD-10-CM

## 2023-11-21 DIAGNOSIS — C82.30 FOLLICULAR LYMPHOMA GRADE IIIA, UNSPECIFIED SITE: ICD-10-CM

## 2023-11-21 DIAGNOSIS — Z90.89 ACQUIRED ABSENCE OF OTHER ORGANS: Chronic | ICD-10-CM

## 2023-11-21 PROCEDURE — 78816 PET IMAGE W/CT FULL BODY: CPT | Mod: 26,PS

## 2023-12-12 ENCOUNTER — APPOINTMENT (OUTPATIENT)
Dept: FAMILY MEDICINE | Facility: CLINIC | Age: 63
End: 2023-12-12
Payer: COMMERCIAL

## 2023-12-12 DIAGNOSIS — R59.0 LOCALIZED ENLARGED LYMPH NODES: ICD-10-CM

## 2023-12-12 DIAGNOSIS — C82.30: ICD-10-CM

## 2023-12-12 DIAGNOSIS — R05.3 CHRONIC COUGH: ICD-10-CM

## 2023-12-12 DIAGNOSIS — R73.03 PREDIABETES.: ICD-10-CM

## 2023-12-12 PROCEDURE — 99214 OFFICE O/P EST MOD 30 MIN: CPT

## 2024-01-09 ENCOUNTER — LABORATORY RESULT (OUTPATIENT)
Age: 64
End: 2024-01-09

## 2024-01-15 LAB
25(OH)D3 SERPL-MCNC: 64.1 NG/ML
ALBUMIN SERPL ELPH-MCNC: 4.7 G/DL
ALP BLD-CCNC: 77 U/L
ALT SERPL-CCNC: 31 U/L
ANION GAP SERPL CALC-SCNC: 14 MMOL/L
APPEARANCE: CLEAR
AST SERPL-CCNC: 26 U/L
BASOPHILS # BLD AUTO: 0.09 K/UL
BASOPHILS NFR BLD AUTO: 1.2 %
BILIRUB SERPL-MCNC: 0.5 MG/DL
BILIRUBIN URINE: NEGATIVE
BLOOD URINE: NEGATIVE
BUN SERPL-MCNC: 18 MG/DL
CALCIUM SERPL-MCNC: 9.2 MG/DL
CEA SERPL-MCNC: 1.3 NG/ML
CHLORIDE SERPL-SCNC: 99 MMOL/L
CHOLEST SERPL-MCNC: 248 MG/DL
CK SERPL-CCNC: 77 U/L
CO2 SERPL-SCNC: 24 MMOL/L
COLOR: YELLOW
CREAT SERPL-MCNC: 1.11 MG/DL
CREAT SPEC-SCNC: 88 MG/DL
CRP SERPL-MCNC: <3 MG/L
DHEA-SULFATE, SERUM: 90 UG/DL
EGFR: 75 ML/MIN/1.73M2
EOSINOPHIL # BLD AUTO: 0.36 K/UL
EOSINOPHIL NFR BLD AUTO: 4.7 %
ERYTHROCYTE [SEDIMENTATION RATE] IN BLOOD BY WESTERGREN METHOD: 7 MM/HR
ESTIMATED AVERAGE GLUCOSE: 123 MG/DL
ESTRADIOL SERPL-MCNC: 27 PG/ML
ESTRONE SERPL-MCNC: 40 PG/ML
FERRITIN SERPL-MCNC: 229 NG/ML
FIBRINOGEN AG PPP IA-MCNC: 359 MG/DL
FOLATE SERPL-MCNC: 13.6 NG/ML
GLUCOSE QUALITATIVE U: NEGATIVE MG/DL
GLUCOSE SERPL-MCNC: 111 MG/DL
HBA1C MFR BLD HPLC: 5.9 %
HCT VFR BLD CALC: 44.7 %
HDLC SERPL-MCNC: 55 MG/DL
HGB BLD-MCNC: 15 G/DL
IMM GRANULOCYTES NFR BLD AUTO: 1.7 %
IODINE, SERUM: 216.3 UG/L
IRON SATN MFR SERPL: 45 %
IRON SERPL-MCNC: 143 UG/DL
KETONES URINE: NEGATIVE MG/DL
LACTATE BLDA-MCNC: 1.5 MMOL/L
LDH SERPL-CCNC: 193 U/L
LDLC SERPL CALC-MCNC: 167 MG/DL
LEUKOCYTE ESTERASE URINE: NEGATIVE
LYMPHOCYTES # BLD AUTO: 2.44 K/UL
LYMPHOCYTES NFR BLD AUTO: 32 %
MAN DIFF?: NORMAL
MCHC RBC-ENTMCNC: 30.3 PG
MCHC RBC-ENTMCNC: 33.6 GM/DL
MCV RBC AUTO: 90.3 FL
MICROALBUMIN 24H UR DL<=1MG/L-MCNC: <1.2 MG/DL
MICROALBUMIN/CREAT 24H UR-RTO: NORMAL MG/G
MONOCYTES # BLD AUTO: 0.71 K/UL
MONOCYTES NFR BLD AUTO: 9.3 %
NEUTROPHILS # BLD AUTO: 3.9 K/UL
NEUTROPHILS NFR BLD AUTO: 51.1 %
NITRITE URINE: NEGATIVE
NONHDLC SERPL-MCNC: 194 MG/DL
PH URINE: 7.5
PK RBC-CCNC: 8.2 U/G HB
PLATELET # BLD AUTO: 325 K/UL
POTASSIUM SERPL-SCNC: 3.9 MMOL/L
PROT SERPL-MCNC: 7.1 G/DL
PROTEIN URINE: NEGATIVE MG/DL
PSA FREE FLD-MCNC: 22 %
PSA FREE SERPL-MCNC: 0.67 NG/ML
PSA SERPL-MCNC: 2.99 NG/ML
RBC # BLD: 4.95 M/UL
RBC # FLD: 13.1 %
SHBG SERPL-SCNC: 36.3 NMOL/L
SODIUM SERPL-SCNC: 137 MMOL/L
SPECIFIC GRAVITY URINE: 1.02
T3RU NFR SERPL: 0.9 TBI
T4 FREE SERPL-MCNC: 1.2 NG/DL
TESTOST FREE SERPL-MCNC: 5.2 PG/ML
TESTOST SERPL-MCNC: 422 NG/DL
THYROGLOB AB SERPL-ACNC: <20 IU/ML
THYROPEROXIDASE AB SERPL IA-ACNC: 13.5 IU/ML
TIBC SERPL-MCNC: 316 UG/DL
TRIGL SERPL-MCNC: 149 MG/DL
TSH SERPL-ACNC: 4 UIU/ML
UIBC SERPL-MCNC: 173 UG/DL
URATE SERPL-MCNC: 5.9 MG/DL
UROBILINOGEN URINE: 0.2 MG/DL
VIT B12 SERPL-MCNC: 523 PG/ML
WBC # FLD AUTO: 7.63 K/UL

## 2024-01-15 RX ORDER — BLOOD-GLUCOSE SENSOR
EACH MISCELLANEOUS
Qty: 6 | Refills: 3 | Status: ACTIVE | COMMUNITY
Start: 2024-01-15 | End: 1900-01-01

## 2024-01-18 ENCOUNTER — OUTPATIENT (OUTPATIENT)
Dept: OUTPATIENT SERVICES | Facility: HOSPITAL | Age: 64
LOS: 1 days | End: 2024-01-18

## 2024-01-18 ENCOUNTER — APPOINTMENT (OUTPATIENT)
Dept: ULTRASOUND IMAGING | Facility: CLINIC | Age: 64
End: 2024-01-18
Payer: COMMERCIAL

## 2024-01-18 DIAGNOSIS — Z90.89 ACQUIRED ABSENCE OF OTHER ORGANS: Chronic | ICD-10-CM

## 2024-01-18 DIAGNOSIS — Z98.890 OTHER SPECIFIED POSTPROCEDURAL STATES: Chronic | ICD-10-CM

## 2024-01-18 DIAGNOSIS — R59.0 LOCALIZED ENLARGED LYMPH NODES: ICD-10-CM

## 2024-01-18 PROCEDURE — 76536 US EXAM OF HEAD AND NECK: CPT | Mod: 26

## 2024-01-22 LAB
INSULIN FREE SERPL-MCNC: 4.8 UU/ML
INSULIN: 4.8 UU/ML

## 2024-02-05 RX ORDER — CIPROFLOXACIN HYDROCHLORIDE 500 MG/1
500 TABLET, FILM COATED ORAL
Qty: 20 | Refills: 0 | Status: ACTIVE | COMMUNITY
Start: 2024-02-05 | End: 1900-01-01

## 2024-02-05 RX ORDER — NYSTATIN 100000 [USP'U]/ML
100000 SUSPENSION ORAL 4 TIMES DAILY
Qty: 200 | Refills: 1 | Status: ACTIVE | COMMUNITY
Start: 2024-02-05 | End: 1900-01-01

## 2024-02-06 ENCOUNTER — APPOINTMENT (OUTPATIENT)
Dept: CARDIOLOGY | Facility: CLINIC | Age: 64
End: 2024-02-06
Payer: COMMERCIAL

## 2024-02-06 VITALS
HEIGHT: 70 IN | SYSTOLIC BLOOD PRESSURE: 134 MMHG | OXYGEN SATURATION: 99 % | WEIGHT: 170 LBS | BODY MASS INDEX: 24.34 KG/M2 | DIASTOLIC BLOOD PRESSURE: 82 MMHG | HEART RATE: 76 BPM

## 2024-02-06 DIAGNOSIS — C82.80 OTHER TYPES OF FOLLICULAR LYMPHOMA, UNSPECIFIED SITE: ICD-10-CM

## 2024-02-06 DIAGNOSIS — Z83.438 FAMILY HISTORY OF OTHER DISORDER OF LIPOPROTEIN METABOLISM AND OTHER LIPIDEMIA: ICD-10-CM

## 2024-02-06 DIAGNOSIS — Z82.49 FAMILY HISTORY OF ISCHEMIC HEART DISEASE AND OTHER DISEASES OF THE CIRCULATORY SYSTEM: ICD-10-CM

## 2024-02-06 DIAGNOSIS — E78.5 HYPERLIPIDEMIA, UNSPECIFIED: ICD-10-CM

## 2024-02-06 PROCEDURE — 99204 OFFICE O/P NEW MOD 45 MIN: CPT | Mod: 25

## 2024-02-06 PROCEDURE — 93000 ELECTROCARDIOGRAM COMPLETE: CPT

## 2024-02-06 NOTE — DISCUSSION/SUMMARY
[FreeTextEntry1] : Pt is a 62 y/o M PMH ?BAV, non-hodgkins lymphoma 2019 s/p RCHOP, HLD, preDM Now with recurrence of lymphoma - plan for biopsy  Will check transthoracic echocardiogram to evaluate left ventricular function and assess for any structural abnormalities   HLD: restart statin  Advised lifestyle modifications   Most recent available lab results were reviewed with pt. The described plan was discussed with the pt.  All questions and concerns were addressed to the best of my knowledge.  [EKG obtained to assist in diagnosis and management of assessed problem(s)] : EKG obtained to assist in diagnosis and management of assessed problem(s)

## 2024-02-06 NOTE — HISTORY OF PRESENT ILLNESS
[FreeTextEntry1] : Pt is a 64 y/o M who is referred here today by their PCP for evaluation. Pt has PMH ?BAV, non-hodgkins lymphoma 2019 s/p RCHOP, HLD, preDM Now with recurrence of lymphoma - plan for biopsy Home 's for a couple of days and now back down 140's/80's HE is feeling well overall - denies CP, SOB, diaphoresis, palpitations, dizziness, syncope, LE edema, PND, orthopnea.    Family hx: father MI 50's/RBBB/HTN/DM, mother HTN Smoking status: never no ETOH no drug use Current exercise: none Daily water intake: 32-64 oz Daily caffeine intake: none Previous cardiac testing: TTE and stress test many yrs ago "normal" Previous hospitalizations/surgeries: rhinoplasty 1979, diverticulitis, Lyme 1997, transverse myolitis - no problems with anesthesia

## 2024-02-15 ENCOUNTER — NON-APPOINTMENT (OUTPATIENT)
Age: 64
End: 2024-02-15

## 2024-08-27 ENCOUNTER — APPOINTMENT (OUTPATIENT)
Dept: FAMILY MEDICINE | Facility: CLINIC | Age: 64
End: 2024-08-27
Payer: COMMERCIAL

## 2024-08-27 VITALS
OXYGEN SATURATION: 98 % | BODY MASS INDEX: 24.77 KG/M2 | HEART RATE: 66 BPM | DIASTOLIC BLOOD PRESSURE: 69 MMHG | HEIGHT: 70 IN | TEMPERATURE: 97.6 F | SYSTOLIC BLOOD PRESSURE: 120 MMHG | WEIGHT: 173 LBS | RESPIRATION RATE: 16 BRPM

## 2024-08-27 DIAGNOSIS — E78.5 HYPERLIPIDEMIA, UNSPECIFIED: ICD-10-CM

## 2024-08-27 DIAGNOSIS — E11.9 TYPE 2 DIABETES MELLITUS W/OUT COMPLICATIONS: ICD-10-CM

## 2024-08-27 DIAGNOSIS — C82.30: ICD-10-CM

## 2024-08-27 DIAGNOSIS — M54.50 LOW BACK PAIN, UNSPECIFIED: ICD-10-CM

## 2024-08-27 PROCEDURE — 99214 OFFICE O/P EST MOD 30 MIN: CPT

## 2024-08-27 PROCEDURE — G2211 COMPLEX E/M VISIT ADD ON: CPT

## 2024-11-14 ENCOUNTER — LABORATORY RESULT (OUTPATIENT)
Age: 64
End: 2024-11-14

## 2025-05-29 RX ORDER — CYCLOBENZAPRINE HYDROCHLORIDE 5 MG/1
5 TABLET, FILM COATED ORAL
Qty: 90 | Refills: 3 | Status: ACTIVE | COMMUNITY
Start: 2025-05-29 | End: 1900-01-01

## 2025-05-30 ENCOUNTER — APPOINTMENT (OUTPATIENT)
Dept: FAMILY MEDICINE | Facility: CLINIC | Age: 65
End: 2025-05-30
Payer: COMMERCIAL

## 2025-05-30 VITALS
OXYGEN SATURATION: 98 % | HEIGHT: 70 IN | RESPIRATION RATE: 18 BRPM | BODY MASS INDEX: 24.34 KG/M2 | SYSTOLIC BLOOD PRESSURE: 126 MMHG | HEART RATE: 68 BPM | WEIGHT: 170 LBS | DIASTOLIC BLOOD PRESSURE: 78 MMHG | TEMPERATURE: 98 F

## 2025-05-30 DIAGNOSIS — M54.50 LOW BACK PAIN, UNSPECIFIED: ICD-10-CM

## 2025-05-30 DIAGNOSIS — C82.30: ICD-10-CM

## 2025-05-30 DIAGNOSIS — E11.9 TYPE 2 DIABETES MELLITUS W/OUT COMPLICATIONS: ICD-10-CM

## 2025-05-30 DIAGNOSIS — R59.1 GENERALIZED ENLARGED LYMPH NODES: ICD-10-CM

## 2025-05-30 DIAGNOSIS — R10.9 UNSPECIFIED ABDOMINAL PAIN: ICD-10-CM

## 2025-05-30 DIAGNOSIS — N40.1 BENIGN PROSTATIC HYPERPLASIA WITH LOWER URINARY TRACT SYMPMS: ICD-10-CM

## 2025-05-30 DIAGNOSIS — N13.8 BENIGN PROSTATIC HYPERPLASIA WITH LOWER URINARY TRACT SYMPMS: ICD-10-CM

## 2025-05-30 PROCEDURE — 99214 OFFICE O/P EST MOD 30 MIN: CPT | Mod: 95

## 2025-05-30 PROCEDURE — G2211 COMPLEX E/M VISIT ADD ON: CPT | Mod: 95

## 2025-05-30 RX ORDER — TAMSULOSIN HYDROCHLORIDE 0.4 MG/1
0.4 CAPSULE ORAL
Qty: 90 | Refills: 0 | Status: ACTIVE | COMMUNITY
Start: 2025-05-30 | End: 1900-01-01

## 2025-06-23 PROBLEM — R10.31 RLQ DISCOMFORT: Status: ACTIVE | Noted: 2025-06-23

## 2025-07-07 NOTE — ASU DISCHARGE PLAN (ADULT/PEDIATRIC) - SIGNS AND SYMPTOMS OF INFECTION: FEVER, REDNESS, SWELLING, FOUL SMELLING DISCHARGE
Patient: Stephan Gibbs    Procedure Summary       Date: 07/07/25 Room / Location:  [facility] /  [facility]    Anesthesia Start: 0945 Anesthesia Stop: 1120    Procedure: TRANSURETHRAL PROSTATE AQUABLATION Diagnosis:       Benign prostatic hyperplasia with weak urinary stream      (Benign prostatic hyperplasia with weak urinary stream [N40.1, R39.12])    Surgeons: Willy Chisholm MD Provider: Jose Patten MD    Anesthesia Type: general ASA Status: 3            Anesthesia Type: general    Vitals  Vitals Value Taken Time   BP     Temp     Pulse 72 07/07/25 11:19   Resp     SpO2 94 % 07/07/25 11:19   Vitals shown include unfiled device data.        Post Anesthesia Care and Evaluation    Patient location during evaluation: PACU  Patient participation: complete - patient participated  Level of consciousness: awake and alert  Pain management: adequate    Airway patency: patent  Anesthetic complications: No anesthetic complications  PONV Status: none  Cardiovascular status: hemodynamically stable and acceptable  Respiratory status: nonlabored ventilation, acceptable and nasal cannula  Hydration status: acceptable    
Statement Selected

## 2025-07-22 ENCOUNTER — LABORATORY RESULT (OUTPATIENT)
Age: 65
End: 2025-07-22

## 2025-07-26 ENCOUNTER — TRANSCRIPTION ENCOUNTER (OUTPATIENT)
Age: 65
End: 2025-07-26

## 2025-08-19 ENCOUNTER — APPOINTMENT (OUTPATIENT)
Dept: NUCLEAR MEDICINE | Facility: CLINIC | Age: 65
End: 2025-08-19
Payer: COMMERCIAL

## 2025-08-19 ENCOUNTER — OUTPATIENT (OUTPATIENT)
Dept: OUTPATIENT SERVICES | Facility: HOSPITAL | Age: 65
LOS: 1 days | End: 2025-08-19

## 2025-08-19 DIAGNOSIS — Z98.890 OTHER SPECIFIED POSTPROCEDURAL STATES: Chronic | ICD-10-CM

## 2025-08-19 DIAGNOSIS — Z90.89 ACQUIRED ABSENCE OF OTHER ORGANS: Chronic | ICD-10-CM

## 2025-08-19 DIAGNOSIS — C82.80 OTHER TYPES OF FOLLICULAR LYMPHOMA, UNSPECIFIED SITE: ICD-10-CM

## 2025-08-19 PROCEDURE — 78815 PET IMAGE W/CT SKULL-THIGH: CPT | Mod: 26,PS

## 2025-08-27 ENCOUNTER — RX RENEWAL (OUTPATIENT)
Age: 65
End: 2025-08-27